# Patient Record
Sex: FEMALE | Race: OTHER | HISPANIC OR LATINO | URBAN - METROPOLITAN AREA
[De-identification: names, ages, dates, MRNs, and addresses within clinical notes are randomized per-mention and may not be internally consistent; named-entity substitution may affect disease eponyms.]

---

## 2017-04-05 ENCOUNTER — EMERGENCY (EMERGENCY)
Facility: HOSPITAL | Age: 67
LOS: 0 days | Discharge: HOME | End: 2017-04-05

## 2017-06-27 DIAGNOSIS — Z88.8 ALLERGY STATUS TO OTHER DRUGS, MEDICAMENTS AND BIOLOGICAL SUBSTANCES STATUS: ICD-10-CM

## 2017-06-27 DIAGNOSIS — R06.02 SHORTNESS OF BREATH: ICD-10-CM

## 2017-06-27 DIAGNOSIS — Z90.710 ACQUIRED ABSENCE OF BOTH CERVIX AND UTERUS: ICD-10-CM

## 2017-06-27 DIAGNOSIS — J45.901 UNSPECIFIED ASTHMA WITH (ACUTE) EXACERBATION: ICD-10-CM

## 2017-06-27 DIAGNOSIS — Z79.4 LONG TERM (CURRENT) USE OF INSULIN: ICD-10-CM

## 2017-06-27 DIAGNOSIS — E78.00 PURE HYPERCHOLESTEROLEMIA, UNSPECIFIED: ICD-10-CM

## 2017-06-27 DIAGNOSIS — E11.9 TYPE 2 DIABETES MELLITUS WITHOUT COMPLICATIONS: ICD-10-CM

## 2017-06-27 DIAGNOSIS — I10 ESSENTIAL (PRIMARY) HYPERTENSION: ICD-10-CM

## 2017-06-27 DIAGNOSIS — Z79.82 LONG TERM (CURRENT) USE OF ASPIRIN: ICD-10-CM

## 2017-06-27 DIAGNOSIS — Z88.6 ALLERGY STATUS TO ANALGESIC AGENT: ICD-10-CM

## 2017-12-18 ENCOUNTER — INPATIENT (INPATIENT)
Facility: HOSPITAL | Age: 67
LOS: 0 days | Discharge: HOME | End: 2017-12-18
Attending: EMERGENCY MEDICINE

## 2017-12-18 DIAGNOSIS — J44.1 CHRONIC OBSTRUCTIVE PULMONARY DISEASE WITH (ACUTE) EXACERBATION: ICD-10-CM

## 2017-12-18 DIAGNOSIS — J45.901 UNSPECIFIED ASTHMA WITH (ACUTE) EXACERBATION: ICD-10-CM

## 2017-12-22 ENCOUNTER — INPATIENT (INPATIENT)
Facility: HOSPITAL | Age: 67
LOS: 5 days | Discharge: HOME | End: 2017-12-28
Attending: INTERNAL MEDICINE

## 2017-12-22 DIAGNOSIS — J45.901 UNSPECIFIED ASTHMA WITH (ACUTE) EXACERBATION: ICD-10-CM

## 2017-12-22 DIAGNOSIS — J44.1 CHRONIC OBSTRUCTIVE PULMONARY DISEASE WITH (ACUTE) EXACERBATION: ICD-10-CM

## 2018-01-02 DIAGNOSIS — F41.9 ANXIETY DISORDER, UNSPECIFIED: ICD-10-CM

## 2018-01-02 DIAGNOSIS — K59.00 CONSTIPATION, UNSPECIFIED: ICD-10-CM

## 2018-01-02 DIAGNOSIS — I10 ESSENTIAL (PRIMARY) HYPERTENSION: ICD-10-CM

## 2018-01-02 DIAGNOSIS — F32.9 MAJOR DEPRESSIVE DISORDER, SINGLE EPISODE, UNSPECIFIED: ICD-10-CM

## 2018-01-02 DIAGNOSIS — E87.1 HYPO-OSMOLALITY AND HYPONATREMIA: ICD-10-CM

## 2018-01-02 DIAGNOSIS — E87.6 HYPOKALEMIA: ICD-10-CM

## 2018-01-02 DIAGNOSIS — J45.901 UNSPECIFIED ASTHMA WITH (ACUTE) EXACERBATION: ICD-10-CM

## 2018-01-02 DIAGNOSIS — E78.00 PURE HYPERCHOLESTEROLEMIA, UNSPECIFIED: ICD-10-CM

## 2018-01-02 DIAGNOSIS — E11.65 TYPE 2 DIABETES MELLITUS WITH HYPERGLYCEMIA: ICD-10-CM

## 2018-01-02 DIAGNOSIS — Z79.4 LONG TERM (CURRENT) USE OF INSULIN: ICD-10-CM

## 2018-01-02 DIAGNOSIS — J06.9 ACUTE UPPER RESPIRATORY INFECTION, UNSPECIFIED: ICD-10-CM

## 2018-01-02 DIAGNOSIS — J40 BRONCHITIS, NOT SPECIFIED AS ACUTE OR CHRONIC: ICD-10-CM

## 2018-01-02 DIAGNOSIS — J45.31 MILD PERSISTENT ASTHMA WITH (ACUTE) EXACERBATION: ICD-10-CM

## 2018-04-03 PROBLEM — Z00.00 ENCOUNTER FOR PREVENTIVE HEALTH EXAMINATION: Status: ACTIVE | Noted: 2018-04-03

## 2018-08-09 ENCOUNTER — OUTPATIENT (OUTPATIENT)
Dept: OUTPATIENT SERVICES | Facility: HOSPITAL | Age: 68
LOS: 1 days | Discharge: HOME | End: 2018-08-09

## 2018-08-09 DIAGNOSIS — E78.00 PURE HYPERCHOLESTEROLEMIA, UNSPECIFIED: ICD-10-CM

## 2018-12-26 ENCOUNTER — TRANSCRIPTION ENCOUNTER (OUTPATIENT)
Age: 68
End: 2018-12-26

## 2019-02-20 ENCOUNTER — INPATIENT (INPATIENT)
Facility: HOSPITAL | Age: 69
LOS: 3 days | Discharge: HOME | End: 2019-02-24
Attending: INTERNAL MEDICINE | Admitting: INTERNAL MEDICINE

## 2019-02-20 VITALS
RESPIRATION RATE: 18 BRPM | HEART RATE: 110 BPM | DIASTOLIC BLOOD PRESSURE: 60 MMHG | OXYGEN SATURATION: 98 % | SYSTOLIC BLOOD PRESSURE: 123 MMHG | TEMPERATURE: 103 F

## 2019-02-20 LAB
ALBUMIN SERPL ELPH-MCNC: 3.6 G/DL — SIGNIFICANT CHANGE UP (ref 3.5–5.2)
ALP SERPL-CCNC: 94 U/L — SIGNIFICANT CHANGE UP (ref 30–115)
ALT FLD-CCNC: 11 U/L — SIGNIFICANT CHANGE UP (ref 0–41)
ANION GAP SERPL CALC-SCNC: 16 MMOL/L — HIGH (ref 7–14)
ANISOCYTOSIS BLD QL: SLIGHT — SIGNIFICANT CHANGE UP
APPEARANCE UR: CLEAR — SIGNIFICANT CHANGE UP
AST SERPL-CCNC: 22 U/L — SIGNIFICANT CHANGE UP (ref 0–41)
BACTERIA # UR AUTO: ABNORMAL /HPF
BASE EXCESS BLDV CALC-SCNC: 8.8 MMOL/L — HIGH (ref -2–2)
BASOPHILS # BLD AUTO: 0 K/UL — SIGNIFICANT CHANGE UP (ref 0–0.2)
BASOPHILS NFR BLD AUTO: 0 % — SIGNIFICANT CHANGE UP (ref 0–1)
BILIRUB DIRECT SERPL-MCNC: 0.2 MG/DL — SIGNIFICANT CHANGE UP (ref 0–0.2)
BILIRUB INDIRECT FLD-MCNC: 0.5 MG/DL — SIGNIFICANT CHANGE UP (ref 0.2–1.2)
BILIRUB SERPL-MCNC: 0.7 MG/DL — SIGNIFICANT CHANGE UP (ref 0.2–1.2)
BILIRUB UR-MCNC: NEGATIVE — SIGNIFICANT CHANGE UP
BUN SERPL-MCNC: 11 MG/DL — SIGNIFICANT CHANGE UP (ref 10–20)
CA-I SERPL-SCNC: 1.08 MMOL/L — LOW (ref 1.12–1.3)
CALCIUM SERPL-MCNC: 8.4 MG/DL — LOW (ref 8.5–10.1)
CHLORIDE SERPL-SCNC: 88 MMOL/L — LOW (ref 98–110)
CO2 SERPL-SCNC: 28 MMOL/L — SIGNIFICANT CHANGE UP (ref 17–32)
COLOR SPEC: YELLOW — SIGNIFICANT CHANGE UP
CREAT SERPL-MCNC: 0.8 MG/DL — SIGNIFICANT CHANGE UP (ref 0.7–1.5)
DIFF PNL FLD: ABNORMAL
EOSINOPHIL # BLD AUTO: 0.17 K/UL — SIGNIFICANT CHANGE UP (ref 0–0.7)
EOSINOPHIL NFR BLD AUTO: 0.9 % — SIGNIFICANT CHANGE UP (ref 0–8)
EPI CELLS # UR: ABNORMAL /HPF
FLU A RESULT: NEGATIVE — SIGNIFICANT CHANGE UP
FLU A RESULT: NEGATIVE — SIGNIFICANT CHANGE UP
FLUAV AG NPH QL: NEGATIVE — SIGNIFICANT CHANGE UP
FLUBV AG NPH QL: NEGATIVE — SIGNIFICANT CHANGE UP
GAS PNL BLDV: 131 MMOL/L — LOW (ref 136–145)
GAS PNL BLDV: SIGNIFICANT CHANGE UP
GIANT PLATELETS BLD QL SMEAR: PRESENT — SIGNIFICANT CHANGE UP
GLUCOSE SERPL-MCNC: 308 MG/DL — HIGH (ref 70–99)
GLUCOSE UR QL: 250 MG/DL
HCO3 BLDV-SCNC: 33 MMOL/L — HIGH (ref 22–29)
HCT VFR BLD CALC: 28.1 % — LOW (ref 37–47)
HCT VFR BLDA CALC: 30.4 % — LOW (ref 34–44)
HGB BLD CALC-MCNC: 9.9 G/DL — LOW (ref 14–18)
HGB BLD-MCNC: 9.3 G/DL — LOW (ref 12–16)
KETONES UR-MCNC: ABNORMAL
LACTATE BLDV-MCNC: 2 MMOL/L — HIGH (ref 0.5–1.6)
LACTATE SERPL-SCNC: 2 MMOL/L — SIGNIFICANT CHANGE UP (ref 0.5–2.2)
LEUKOCYTE ESTERASE UR-ACNC: NEGATIVE — SIGNIFICANT CHANGE UP
LIDOCAIN IGE QN: 20 U/L — SIGNIFICANT CHANGE UP (ref 7–60)
LYMPHOCYTES # BLD AUTO: 0.5 K/UL — LOW (ref 1.2–3.4)
LYMPHOCYTES # BLD AUTO: 2.6 % — LOW (ref 20.5–51.1)
MAGNESIUM SERPL-MCNC: 1.6 MG/DL — LOW (ref 1.8–2.4)
MANUAL SMEAR VERIFICATION: SIGNIFICANT CHANGE UP
MCHC RBC-ENTMCNC: 28.8 PG — SIGNIFICANT CHANGE UP (ref 27–31)
MCHC RBC-ENTMCNC: 33.1 G/DL — SIGNIFICANT CHANGE UP (ref 32–37)
MCV RBC AUTO: 87 FL — SIGNIFICANT CHANGE UP (ref 81–99)
MONOCYTES # BLD AUTO: 1.17 K/UL — HIGH (ref 0.1–0.6)
MONOCYTES NFR BLD AUTO: 6.1 % — SIGNIFICANT CHANGE UP (ref 1.7–9.3)
NEUTROPHILS # BLD AUTO: 16.61 K/UL — HIGH (ref 1.4–6.5)
NEUTROPHILS NFR BLD AUTO: 86.9 % — HIGH (ref 42.2–75.2)
NITRITE UR-MCNC: NEGATIVE — SIGNIFICANT CHANGE UP
NRBC # BLD: 0 /100 WBCS — SIGNIFICANT CHANGE UP (ref 0–0)
PCO2 BLDV: 45 MMHG — SIGNIFICANT CHANGE UP (ref 41–51)
PH BLDV: 7.48 — HIGH (ref 7.26–7.43)
PH UR: 7 — SIGNIFICANT CHANGE UP (ref 5–8)
PLAT MORPH BLD: NORMAL — SIGNIFICANT CHANGE UP
PLATELET # BLD AUTO: 270 K/UL — SIGNIFICANT CHANGE UP (ref 130–400)
PO2 BLDV: 23 MMHG — SIGNIFICANT CHANGE UP (ref 20–40)
POTASSIUM BLDV-SCNC: 2.9 MMOL/L — LOW (ref 3.3–5.6)
POTASSIUM SERPL-MCNC: 4 MMOL/L — SIGNIFICANT CHANGE UP (ref 3.5–5)
POTASSIUM SERPL-SCNC: 4 MMOL/L — SIGNIFICANT CHANGE UP (ref 3.5–5)
PROT SERPL-MCNC: 6.6 G/DL — SIGNIFICANT CHANGE UP (ref 6–8)
PROT UR-MCNC: 100 MG/DL
RBC # BLD: 3.23 M/UL — LOW (ref 4.2–5.4)
RBC # FLD: 14.3 % — SIGNIFICANT CHANGE UP (ref 11.5–14.5)
RBC BLD AUTO: ABNORMAL
RBC CASTS # UR COMP ASSIST: ABNORMAL /HPF
RSV RESULT: NEGATIVE — SIGNIFICANT CHANGE UP
RSV RNA RESP QL NAA+PROBE: NEGATIVE — SIGNIFICANT CHANGE UP
SAO2 % BLDV: 43 % — SIGNIFICANT CHANGE UP
SODIUM SERPL-SCNC: 132 MMOL/L — LOW (ref 135–146)
SP GR SPEC: 1.01 — SIGNIFICANT CHANGE UP (ref 1.01–1.03)
UROBILINOGEN FLD QL: 1 MG/DL (ref 0.2–0.2)
VARIANT LYMPHS # BLD: 3.5 % — SIGNIFICANT CHANGE UP (ref 0–5)
WBC # BLD: 19.11 K/UL — HIGH (ref 4.8–10.8)
WBC # FLD AUTO: 19.11 K/UL — HIGH (ref 4.8–10.8)
WBC UR QL: SIGNIFICANT CHANGE UP /HPF

## 2019-02-20 RX ORDER — POTASSIUM CHLORIDE 20 MEQ
40 PACKET (EA) ORAL ONCE
Qty: 0 | Refills: 0 | Status: COMPLETED | OUTPATIENT
Start: 2019-02-20 | End: 2019-02-20

## 2019-02-20 RX ORDER — ACETAMINOPHEN 500 MG
975 TABLET ORAL ONCE
Qty: 0 | Refills: 0 | Status: COMPLETED | OUTPATIENT
Start: 2019-02-20 | End: 2019-02-20

## 2019-02-20 RX ORDER — SODIUM CHLORIDE 9 MG/ML
2000 INJECTION INTRAMUSCULAR; INTRAVENOUS; SUBCUTANEOUS ONCE
Qty: 0 | Refills: 0 | Status: COMPLETED | OUTPATIENT
Start: 2019-02-20 | End: 2019-02-20

## 2019-02-20 RX ORDER — CHLORHEXIDINE GLUCONATE 213 G/1000ML
1 SOLUTION TOPICAL
Qty: 0 | Refills: 0 | Status: DISCONTINUED | OUTPATIENT
Start: 2019-02-20 | End: 2019-02-24

## 2019-02-20 RX ORDER — MAGNESIUM SULFATE 500 MG/ML
2 VIAL (ML) INJECTION ONCE
Qty: 0 | Refills: 0 | Status: COMPLETED | OUTPATIENT
Start: 2019-02-20 | End: 2019-02-20

## 2019-02-20 RX ADMIN — Medication 40 MILLIEQUIVALENT(S): at 21:41

## 2019-02-20 RX ADMIN — Medication 50 GRAM(S): at 22:00

## 2019-02-20 RX ADMIN — Medication 975 MILLIGRAM(S): at 19:55

## 2019-02-20 RX ADMIN — SODIUM CHLORIDE 2000 MILLILITER(S): 9 INJECTION INTRAMUSCULAR; INTRAVENOUS; SUBCUTANEOUS at 19:53

## 2019-02-20 NOTE — ED PROVIDER NOTE - CARE PLAN
Principal Discharge DX:	Pneumonia  Secondary Diagnosis:	Hypomagnesemia  Secondary Diagnosis:	Hypokalemia

## 2019-02-20 NOTE — ED ADULT TRIAGE NOTE - CHIEF COMPLAINT QUOTE
As per daughter, pt was shaking earlier today and has been depressed with no appetite the last 5 days.

## 2019-02-20 NOTE — ED PROVIDER NOTE - CLINICAL SUMMARY MEDICAL DECISION MAKING FREE TEXT BOX
pt w/ 5 days feeling weak, productive cough. leukocytosis to 19, CXR showing PNA. will admit for CAP treatment.

## 2019-02-20 NOTE — ED ADULT NURSE NOTE - PMH
Diabetes    HTN (hypertension) Anxiety and depression    Diabetes    HTN (hypertension)    Hyperlipidemia

## 2019-02-20 NOTE — H&P ADULT - NSHPLABSRESULTS_GEN_ALL_CORE
.  LABS:                         9.3    19.11 )-----------( 270      ( 2019 20:10 )             28.1         132<L>  |  88<L>  |  11  ----------------------------<  308<H>  4.0   |  28  |  0.8    Ca    8.4<L>      2019 20:10  Mg     1.6         TPro  6.6  /  Alb  3.6  /  TBili  0.7  /  DBili  0.2  /  AST  22  /  ALT  11  /  AlkPhos  94        Urinalysis Basic - ( 2019 21:22 )    Color: Yellow / Appearance: Clear / S.010 / pH: x  Gluc: x / Ketone: Trace  / Bili: Negative / Urobili: 1.0 mg/dL   Blood: x / Protein: 100 mg/dL / Nitrite: Negative   Leuk Esterase: Negative / RBC: 3-5 /HPF / WBC 3-5 /HPF   Sq Epi: x / Non Sq Epi: Occasional /HPF / Bacteria: Few /HPF    Lactate, Blood: 2.0 mmol/L ( @ 20:10)    RADIOLOGY, EKG & ADDITIONAL TESTS: Reviewed.

## 2019-02-20 NOTE — ED PROVIDER NOTE - PHYSICAL EXAMINATION
VITAL SIGNS: I have reviewed nursing notes and confirm.  CONSTITUTIONAL: Well-developed; well-nourished; in no acute distress.  SKIN: Skin exam is warm and dry, no acute rash.  HEAD: Normocephalic; atraumatic.  EYES: Conjunctiva and sclera clear.  ENT: No nasal discharge; airway clear.   NECK: Supple; non tender.  CARD: S1, S2 normal; no murmurs, gallops, or rubs. Regular rate and rhythm.  RESP: No wheezes, or rhonchi. Speaking in full sentences. (+) right sided rales  ABD: Normal bowel sounds; soft; non-distended; non-tender; No rebound or guarding. No CVA tenderness.  EXT: Normal ROM. No clubbing, cyanosis or edema.  NEURO: Alert, oriented. Grossly unremarkable. No focal deficits. CN II-XII intact. No dysmetria. No ataxia. Sensation intact and equal throughout. Strength 5/5 throughout. Gait steady.

## 2019-02-20 NOTE — ED PROVIDER NOTE - ATTENDING CONTRIBUTION TO CARE
67 yo hx hld, dm, anxiety, depression, copd/asthma not on home o2 here for 5 days feeling weak. cough today w/ minimal white sputum. + body aches and increased frequency of urination. pt also endorsing mild headache- not sudden/severe. no n/v/d. no cp/sob.     vs febrile, tachycardic  gen- NAD, aaox3  card-rrr  lungs- no resp distress, bibasilar crackles  abd-sntnd, no guarding or rebound  neuro- full str/sensation, cn ii-xii grossly intact, normal coordination, no meningismus    r/o pna vs flu  unlikely intra-abdo source, no evidence of meningitis

## 2019-02-20 NOTE — ED ADULT NURSE NOTE - OBJECTIVE STATEMENT
pt complaining of dysuria, fever and generalized weakness x 2 days worsening today. pt has taken tylenol for fever. pt reports decreased po intake x 2 days, and dizziness and headache. pt denies abd pain denies chest pain, complaining of cough denies shortness of breath.

## 2019-02-20 NOTE — H&P ADULT - ATTENDING COMMENTS
This is 67 yo female with PMH of DM type2, HTN, asthma, and anxiety/depression came to ED c/o fever and chills with weakness. Symptoms started 5 days ago with fever, chills, weakness and body aches, for the last tow days she developed dry cough with pleuritic chest pain on the right side. She also has poor appetite and couldn't eat much no swallowing difficulties. She denies any recent travel, her grand kids were recently sick.   In the ED her VS showed Temp 102.1F, , BP stable. Labs showed WBC 19K, CXR showed right upper lobe consolidation.     PHYSICAL EXAM:  GENERAL: NAD, well-developed  HEAD:  Atraumatic, Normocephalic  EYES: EOMI, PERRLA, conjunctiva and sclera clear  NECK: Supple, No JVD  CHEST/LUNG: Right middle lung tubular breathing with coarse crackles.   HEART: Regular rate and rhythm; S1 S2  ABDOMEN: Soft, Nontender, Nondistended; Bowel sounds present  EXTREMITIES:  2+ Peripheral Pulses, No clubbing, cyanosis, or edema  PSYCH: AAOx3  NEUROLOGY: non-focal  SKIN: No rashes or lesions    A/P   Sepsis  Community acquired Pneumonia:   CXR showed right upper lobe consolidation.   Start on Levaquin 750mg daily.   Send blood Cx, check urine for legionella and strep Pneumonia. Check serum mycoplasma Ab.     HTN:   Continue Norvasc and Losartan    Asthma:   Continue home meds, DuoNeb q 4hrs prn.     Anxiety:   continue home meds    Anemia: normocytic  no acute bleeding, monitor H/H.

## 2019-02-20 NOTE — H&P ADULT - NSHPPHYSICALEXAM_GEN_ALL_CORE
.  VITAL SIGNS:  T(C): 37.8 (02-20-19 @ 21:54), Max: 39.4 (02-20-19 @ 18:26)  T(F): 100.1 (02-20-19 @ 21:54), Max: 102.9 (02-20-19 @ 18:26)  HR: 75 (02-20-19 @ 21:54) (75 - 110)  BP: 105/57 (02-20-19 @ 21:54) (105/57 - 123/60)  BP(mean): --  RR: 18 (02-20-19 @ 21:54) (18 - 18)  SpO2: 98% (02-20-19 @ 21:54) (98% - 98%)  Wt(kg): --    PHYSICAL EXAM:  GEN: No acute distress, witnessed ambulating independently on room air  PULM: R sided crackles  CARD: S1/S2 present. RRR.   GI: Soft, non-tender, non-distended. Bowel sounds present  NEURO: AAOX3

## 2019-02-20 NOTE — H&P ADULT - HISTORY OF PRESENT ILLNESS
67 yo F with PMHx of DM, HLD, asthma, and anxiety/depression presents to the ED c/o generalized weakness and body aches x 5 days with developed of fever/chills/cough productive of white sputum yesterday. Also endorses appetite loss of 5 day duration. She has been taking Tylenol with minimal relief of symptoms. She admits to associated mild headache and urinary frequency. Denies other complaints. Pt denies nausea, vomiting, abdominal pain, diarrhea, dizziness, chest pain, SOB, back pain, LOC, trauma, urinary symptoms, calf pain/swelling, recent travel, recent surgery. 67 yo F with PMHx of DM, HLD, asthma, and anxiety/depression presents to the ED c/o generalized weakness and body aches x 5 days with developed of fever/chills/cough productive of white sputum yesterday. Also endorses appetite loss of 5 day duration. She has been taking Tylenol with minimal relief of symptoms. She admits to associated mild headache and urinary frequency. Denies other complaints. Pt denies nausea, vomiting, abdominal pain, diarrhea, dizziness, chest pain, SOB, back pain, LOC, trauma, urinary symptoms, calf pain/swelling, recent travel, recent surgery. Last admission was one year ago due to asthma exacerbation. Was not on any antibiotic therapy immediately prior to this admission.

## 2019-02-20 NOTE — ED PROVIDER NOTE - OBJECTIVE STATEMENT
67 yo F with PMHx of DM, HLD, asthma, and anxiety/depression presents to the ED c/o weakness and body aches x 5 days with developed of fever/chills/cough productive of white sputum yesterday. She has been taking Tylenol with minimal relief of symptoms. She admits to associated mild headache and urinary frequency. Denies other complaints. Pt denies nausea, vomiting, abdominal pain, diarrhea, dizziness, chest pain, SOB, back pain, LOC, trauma, urinary symptoms, calf pain/swelling, recent travel, recent surgery.

## 2019-02-20 NOTE — ED ADULT NURSE NOTE - NSIMPLEMENTINTERV_GEN_ALL_ED
Implemented All Universal Safety Interventions:  [place] to call system. Call bell, personal items and telephone within reach. Instruct patient to call for assistance. Room bathroom lighting operational. Non-slip footwear when patient is off stretcher. Physically safe environment: no spills, clutter or unnecessary equipment. Stretcher in lowest position, wheels locked, appropriate side rails in place.

## 2019-02-20 NOTE — ED PROVIDER NOTE - NS ED ROS FT
Review of Systems  Constitutional:  (+) fever, chills, generalized weakness  Eyes:  No visual changes, eye pain, or discharge.  ENMT:  No hearing changes, pain, or discharge. No nasal congestion, discharge, or bleeding. No throat pain, swelling, or difficulty swallowing.  Cardiac:  No chest pain, palpitations, syncope, or edema.  Respiratory:  No dyspnea, orthopnea, stridor, wheezing. No hemoptysis. (+) cough  GI:  No nausea, vomiting, diarrhea, or abdominal pain. No melena or hematochezia.  :  No dysuria, hematuria, frequency, or burning.   MS:  No myalgia, muscle weakness, gait abnormality, joint swelling, joint pain, or back pain.  Skin:  No skin rash, pruritis, jaundice, or lesions.  Neuro:  No dizziness, loss of sensation, or focal weakness.  No change in mental status. (+) headache  Endocrine: No history of thyroid disease. (+) DM  Heme/Lymph: No jaundice, easy bruising, or bleeding tendency.

## 2019-02-20 NOTE — H&P ADULT - ASSESSMENT
69 yo F with PMHx of DM, HLD, asthma, and anxiety/depression presented to the ED c/o generalized weakness, loss of appetite, and body aches x 5 days with developed of fever/chills/cough productive of white sputum yesterday.    # Sepsis (fever, tachycardic, WBC elevated on admission) due to Community Acquired Pneumonia  - CURB65 of 1  - Flu/RSV negative  - CXR w/ RML opacity  - f/u UCx/BCx  - lactate 2.0  - s/w levofloxacin  - trend lactate    # Loss of appetite likely due to ongoing infection  - pt attributes to anxiety  - will hold off on IVFs for now, pt appears well hydrated and reports good hydration    # HTN  - c/w home meds    # Anxiety  - c/w home meds    # Diabetes  - FS, s/w lantus/lispro if >180 x 2    # Hypomagnesemia  - repleted    DVT Ppx: Lovenox  Dispo: From Home 69 yo F with PMHx of DM, HLD, asthma, and anxiety/depression presented to the ED c/o generalized weakness, loss of appetite, and body aches x 5 days with developed of fever/chills/cough productive of white sputum yesterday.    # Sepsis (fever, tachycardic, WBC elevated on admission) due to Community Acquired Pneumonia  - CURB65 of 1  - Flu/RSV negative  - CXR w/ RML opacity  - f/u UCx/BCx  - lactate 2.0  - s/w levofloxacin  - trend lactate    # Loss of appetite likely due to ongoing infection  - pt attributes to anxiety  - will hold off on IVFs for now, pt appears well hydrated and reports good hydration    # Asthma  - c/w home meds    # HTN  - c/w home meds    # Anxiety  - c/w home meds    # Diabetes  - FS, s/w lantus/lispro if >180 x 2    # Hypomagnesemia  - repleted    DVT Ppx: Lovenox  Dispo: From Home 67 yo F with PMHx of DM, HLD, asthma, and anxiety/depression presented to the ED c/o generalized weakness, loss of appetite, and body aches x 5 days with developed of fever/chills/cough productive of white sputum yesterday.    # Sepsis (fever, tachycardic, WBC elevated on admission) due to Community Acquired Pneumonia  - CURB65 of 1  - Flu/RSV negative  - CXR w/ RML opacity  - f/u UCx/BCx  - lactate 2.0  - s/w levofloxacin  - s/w IVF   - trend lactate    # Loss of appetite likely due to ongoing infection  - pt attributes to anxiety    # Asthma  - c/w home meds    # HTN  - c/w home meds    # Anxiety  - c/w home meds    # Diabetes  - FS, s/w lantus/lispro if >180 x 2    # Hypomagnesemia  - repleted    DVT Ppx: Lovenox  Dispo: From Home

## 2019-02-21 DIAGNOSIS — Z90.49 ACQUIRED ABSENCE OF OTHER SPECIFIED PARTS OF DIGESTIVE TRACT: Chronic | ICD-10-CM

## 2019-02-21 DIAGNOSIS — Z90.710 ACQUIRED ABSENCE OF BOTH CERVIX AND UTERUS: Chronic | ICD-10-CM

## 2019-02-21 LAB
ANION GAP SERPL CALC-SCNC: 16 MMOL/L — HIGH (ref 7–14)
BASOPHILS # BLD AUTO: 0.04 K/UL — SIGNIFICANT CHANGE UP (ref 0–0.2)
BASOPHILS NFR BLD AUTO: 0.2 % — SIGNIFICANT CHANGE UP (ref 0–1)
BUN SERPL-MCNC: 7 MG/DL — LOW (ref 10–20)
CALCIUM SERPL-MCNC: 8.4 MG/DL — LOW (ref 8.5–10.1)
CHLORIDE SERPL-SCNC: 96 MMOL/L — LOW (ref 98–110)
CO2 SERPL-SCNC: 28 MMOL/L — SIGNIFICANT CHANGE UP (ref 17–32)
CREAT SERPL-MCNC: 0.6 MG/DL — LOW (ref 0.7–1.5)
EOSINOPHIL # BLD AUTO: 0 K/UL — SIGNIFICANT CHANGE UP (ref 0–0.7)
EOSINOPHIL NFR BLD AUTO: 0 % — SIGNIFICANT CHANGE UP (ref 0–8)
GLUCOSE BLDC GLUCOMTR-MCNC: 118 MG/DL — HIGH (ref 70–99)
GLUCOSE BLDC GLUCOMTR-MCNC: 261 MG/DL — HIGH (ref 70–99)
GLUCOSE BLDC GLUCOMTR-MCNC: 274 MG/DL — HIGH (ref 70–99)
GLUCOSE BLDC GLUCOMTR-MCNC: 275 MG/DL — HIGH (ref 70–99)
GLUCOSE SERPL-MCNC: 287 MG/DL — HIGH (ref 70–99)
GRAM STN FLD: SIGNIFICANT CHANGE UP
HCT VFR BLD CALC: 28.6 % — LOW (ref 37–47)
HGB BLD-MCNC: 9.3 G/DL — LOW (ref 12–16)
IMM GRANULOCYTES NFR BLD AUTO: 1.3 % — HIGH (ref 0.1–0.3)
LACTATE SERPL-SCNC: 1.5 MMOL/L — SIGNIFICANT CHANGE UP (ref 0.5–2.2)
LYMPHOCYTES # BLD AUTO: 1.21 K/UL — SIGNIFICANT CHANGE UP (ref 1.2–3.4)
LYMPHOCYTES # BLD AUTO: 6.5 % — LOW (ref 20.5–51.1)
MAGNESIUM SERPL-MCNC: 2.2 MG/DL — SIGNIFICANT CHANGE UP (ref 1.8–2.4)
MCHC RBC-ENTMCNC: 28.8 PG — SIGNIFICANT CHANGE UP (ref 27–31)
MCHC RBC-ENTMCNC: 32.5 G/DL — SIGNIFICANT CHANGE UP (ref 32–37)
MCV RBC AUTO: 88.5 FL — SIGNIFICANT CHANGE UP (ref 81–99)
METHOD TYPE: SIGNIFICANT CHANGE UP
MONOCYTES # BLD AUTO: 1.47 K/UL — HIGH (ref 0.1–0.6)
MONOCYTES NFR BLD AUTO: 7.9 % — SIGNIFICANT CHANGE UP (ref 1.7–9.3)
NEUTROPHILS # BLD AUTO: 15.67 K/UL — HIGH (ref 1.4–6.5)
NEUTROPHILS NFR BLD AUTO: 84.1 % — HIGH (ref 42.2–75.2)
NRBC # BLD: 0 /100 WBCS — SIGNIFICANT CHANGE UP (ref 0–0)
PLATELET # BLD AUTO: 290 K/UL — SIGNIFICANT CHANGE UP (ref 130–400)
POTASSIUM SERPL-MCNC: 4.5 MMOL/L — SIGNIFICANT CHANGE UP (ref 3.5–5)
POTASSIUM SERPL-SCNC: 4.5 MMOL/L — SIGNIFICANT CHANGE UP (ref 3.5–5)
RBC # BLD: 3.23 M/UL — LOW (ref 4.2–5.4)
RBC # FLD: 14.6 % — HIGH (ref 11.5–14.5)
S PNEUM DNA BLD POS QL NAA+NON-PROBE: SIGNIFICANT CHANGE UP
SODIUM SERPL-SCNC: 140 MMOL/L — SIGNIFICANT CHANGE UP (ref 135–146)
SPECIMEN SOURCE: SIGNIFICANT CHANGE UP
WBC # BLD: 18.64 K/UL — HIGH (ref 4.8–10.8)
WBC # FLD AUTO: 18.64 K/UL — HIGH (ref 4.8–10.8)

## 2019-02-21 RX ORDER — DEXTROSE 50 % IN WATER 50 %
15 SYRINGE (ML) INTRAVENOUS ONCE
Qty: 0 | Refills: 0 | Status: DISCONTINUED | OUTPATIENT
Start: 2019-02-21 | End: 2019-02-24

## 2019-02-21 RX ORDER — TRIAMTERENE/HYDROCHLOROTHIAZID 75 MG-50MG
1 TABLET ORAL DAILY
Qty: 0 | Refills: 0 | Status: DISCONTINUED | OUTPATIENT
Start: 2019-02-21 | End: 2019-02-24

## 2019-02-21 RX ORDER — BUDESONIDE AND FORMOTEROL FUMARATE DIHYDRATE 160; 4.5 UG/1; UG/1
2 AEROSOL RESPIRATORY (INHALATION)
Qty: 0 | Refills: 0 | Status: DISCONTINUED | OUTPATIENT
Start: 2019-02-21 | End: 2019-02-24

## 2019-02-21 RX ORDER — PIOGLITAZONE HYDROCHLORIDE 15 MG/1
1 TABLET ORAL
Qty: 0 | Refills: 0 | COMMUNITY

## 2019-02-21 RX ORDER — INSULIN GLARGINE 100 [IU]/ML
7 INJECTION, SOLUTION SUBCUTANEOUS ONCE
Qty: 0 | Refills: 0 | Status: COMPLETED | OUTPATIENT
Start: 2019-02-21 | End: 2019-02-21

## 2019-02-21 RX ORDER — SODIUM CHLORIDE 9 MG/ML
1000 INJECTION, SOLUTION INTRAVENOUS
Qty: 0 | Refills: 0 | Status: DISCONTINUED | OUTPATIENT
Start: 2019-02-21 | End: 2019-02-24

## 2019-02-21 RX ORDER — MONTELUKAST 4 MG/1
1 TABLET, CHEWABLE ORAL
Qty: 0 | Refills: 0 | COMMUNITY

## 2019-02-21 RX ORDER — AMLODIPINE BESYLATE 2.5 MG/1
1 TABLET ORAL
Qty: 0 | Refills: 0 | COMMUNITY

## 2019-02-21 RX ORDER — ENOXAPARIN SODIUM 100 MG/ML
40 INJECTION SUBCUTANEOUS DAILY
Qty: 0 | Refills: 0 | Status: DISCONTINUED | OUTPATIENT
Start: 2019-02-21 | End: 2019-02-24

## 2019-02-21 RX ORDER — LOSARTAN POTASSIUM 100 MG/1
1 TABLET, FILM COATED ORAL
Qty: 0 | Refills: 0 | COMMUNITY

## 2019-02-21 RX ORDER — DEXTROSE 50 % IN WATER 50 %
25 SYRINGE (ML) INTRAVENOUS ONCE
Qty: 0 | Refills: 0 | Status: DISCONTINUED | OUTPATIENT
Start: 2019-02-21 | End: 2019-02-24

## 2019-02-21 RX ORDER — GLUCAGON INJECTION, SOLUTION 0.5 MG/.1ML
1 INJECTION, SOLUTION SUBCUTANEOUS ONCE
Qty: 0 | Refills: 0 | Status: DISCONTINUED | OUTPATIENT
Start: 2019-02-21 | End: 2019-02-24

## 2019-02-21 RX ORDER — TRIAMTERENE/HYDROCHLOROTHIAZID 75 MG-50MG
1 TABLET ORAL
Qty: 0 | Refills: 0 | COMMUNITY

## 2019-02-21 RX ORDER — METFORMIN HYDROCHLORIDE 850 MG/1
1 TABLET ORAL
Qty: 0 | Refills: 0 | COMMUNITY

## 2019-02-21 RX ORDER — DEXTROSE 50 % IN WATER 50 %
12.5 SYRINGE (ML) INTRAVENOUS ONCE
Qty: 0 | Refills: 0 | Status: DISCONTINUED | OUTPATIENT
Start: 2019-02-21 | End: 2019-02-24

## 2019-02-21 RX ORDER — VANCOMYCIN HCL 1 G
750 VIAL (EA) INTRAVENOUS EVERY 12 HOURS
Qty: 0 | Refills: 0 | Status: DISCONTINUED | OUTPATIENT
Start: 2019-02-21 | End: 2019-02-22

## 2019-02-21 RX ORDER — MONTELUKAST 4 MG/1
10 TABLET, CHEWABLE ORAL DAILY
Qty: 0 | Refills: 0 | Status: DISCONTINUED | OUTPATIENT
Start: 2019-02-21 | End: 2019-02-24

## 2019-02-21 RX ORDER — INSULIN NPH HUM/REG INSULIN HM 70-30/ML
15 VIAL (ML) SUBCUTANEOUS
Qty: 0 | Refills: 0 | COMMUNITY

## 2019-02-21 RX ORDER — CLONAZEPAM 1 MG
0.5 TABLET ORAL
Qty: 0 | Refills: 0 | Status: DISCONTINUED | OUTPATIENT
Start: 2019-02-21 | End: 2019-02-24

## 2019-02-21 RX ORDER — BUDESONIDE AND FORMOTEROL FUMARATE DIHYDRATE 160; 4.5 UG/1; UG/1
2 AEROSOL RESPIRATORY (INHALATION)
Qty: 0 | Refills: 0 | COMMUNITY

## 2019-02-21 RX ORDER — CEFEPIME 1 G/1
INJECTION, POWDER, FOR SOLUTION INTRAMUSCULAR; INTRAVENOUS
Qty: 0 | Refills: 0 | Status: DISCONTINUED | OUTPATIENT
Start: 2019-02-21 | End: 2019-02-22

## 2019-02-21 RX ORDER — LOSARTAN POTASSIUM 100 MG/1
25 TABLET, FILM COATED ORAL DAILY
Qty: 0 | Refills: 0 | Status: DISCONTINUED | OUTPATIENT
Start: 2019-02-21 | End: 2019-02-24

## 2019-02-21 RX ORDER — SIMVASTATIN 20 MG/1
20 TABLET, FILM COATED ORAL AT BEDTIME
Qty: 0 | Refills: 0 | Status: DISCONTINUED | OUTPATIENT
Start: 2019-02-21 | End: 2019-02-24

## 2019-02-21 RX ORDER — INSULIN LISPRO 100/ML
VIAL (ML) SUBCUTANEOUS
Qty: 0 | Refills: 0 | Status: DISCONTINUED | OUTPATIENT
Start: 2019-02-21 | End: 2019-02-24

## 2019-02-21 RX ORDER — INSULIN GLARGINE 100 [IU]/ML
15 INJECTION, SOLUTION SUBCUTANEOUS AT BEDTIME
Qty: 0 | Refills: 0 | Status: DISCONTINUED | OUTPATIENT
Start: 2019-02-21 | End: 2019-02-24

## 2019-02-21 RX ORDER — SODIUM CHLORIDE 9 MG/ML
1000 INJECTION INTRAMUSCULAR; INTRAVENOUS; SUBCUTANEOUS
Qty: 0 | Refills: 0 | Status: DISCONTINUED | OUTPATIENT
Start: 2019-02-21 | End: 2019-02-21

## 2019-02-21 RX ORDER — INSULIN LISPRO 100/ML
5 VIAL (ML) SUBCUTANEOUS
Qty: 0 | Refills: 0 | Status: DISCONTINUED | OUTPATIENT
Start: 2019-02-21 | End: 2019-02-24

## 2019-02-21 RX ORDER — SIMVASTATIN 20 MG/1
1 TABLET, FILM COATED ORAL
Qty: 0 | Refills: 0 | COMMUNITY

## 2019-02-21 RX ORDER — SODIUM CHLORIDE 9 MG/ML
1000 INJECTION INTRAMUSCULAR; INTRAVENOUS; SUBCUTANEOUS
Qty: 0 | Refills: 0 | Status: DISCONTINUED | OUTPATIENT
Start: 2019-02-21 | End: 2019-02-23

## 2019-02-21 RX ORDER — AMLODIPINE BESYLATE 2.5 MG/1
5 TABLET ORAL DAILY
Qty: 0 | Refills: 0 | Status: DISCONTINUED | OUTPATIENT
Start: 2019-02-21 | End: 2019-02-24

## 2019-02-21 RX ORDER — CLONAZEPAM 1 MG
1 TABLET ORAL
Qty: 0 | Refills: 0 | COMMUNITY

## 2019-02-21 RX ORDER — CEFEPIME 1 G/1
1000 INJECTION, POWDER, FOR SOLUTION INTRAMUSCULAR; INTRAVENOUS EVERY 12 HOURS
Qty: 0 | Refills: 0 | Status: DISCONTINUED | OUTPATIENT
Start: 2019-02-22 | End: 2019-02-22

## 2019-02-21 RX ORDER — CEFEPIME 1 G/1
1000 INJECTION, POWDER, FOR SOLUTION INTRAMUSCULAR; INTRAVENOUS ONCE
Qty: 0 | Refills: 0 | Status: COMPLETED | OUTPATIENT
Start: 2019-02-21 | End: 2019-02-21

## 2019-02-21 RX ADMIN — Medication 6: at 17:40

## 2019-02-21 RX ADMIN — CEFEPIME 100 MILLIGRAM(S): 1 INJECTION, POWDER, FOR SOLUTION INTRAMUSCULAR; INTRAVENOUS at 21:46

## 2019-02-21 RX ADMIN — Medication 0.5 MILLIGRAM(S): at 10:04

## 2019-02-21 RX ADMIN — CHLORHEXIDINE GLUCONATE 1 APPLICATION(S): 213 SOLUTION TOPICAL at 05:50

## 2019-02-21 RX ADMIN — SODIUM CHLORIDE 75 MILLILITER(S): 9 INJECTION INTRAMUSCULAR; INTRAVENOUS; SUBCUTANEOUS at 03:17

## 2019-02-21 RX ADMIN — AMLODIPINE BESYLATE 5 MILLIGRAM(S): 2.5 TABLET ORAL at 08:09

## 2019-02-21 RX ADMIN — Medication 1 TABLET(S): at 10:02

## 2019-02-21 RX ADMIN — Medication 0.5 MILLIGRAM(S): at 21:48

## 2019-02-21 RX ADMIN — INSULIN GLARGINE 7 UNIT(S): 100 INJECTION, SOLUTION SUBCUTANEOUS at 21:48

## 2019-02-21 RX ADMIN — SODIUM CHLORIDE 75 MILLILITER(S): 9 INJECTION INTRAMUSCULAR; INTRAVENOUS; SUBCUTANEOUS at 11:10

## 2019-02-21 RX ADMIN — Medication 40 MILLIGRAM(S): at 11:05

## 2019-02-21 RX ADMIN — Medication 5 UNIT(S): at 17:39

## 2019-02-21 RX ADMIN — BUDESONIDE AND FORMOTEROL FUMARATE DIHYDRATE 2 PUFF(S): 160; 4.5 AEROSOL RESPIRATORY (INHALATION) at 08:11

## 2019-02-21 RX ADMIN — LOSARTAN POTASSIUM 25 MILLIGRAM(S): 100 TABLET, FILM COATED ORAL at 08:09

## 2019-02-21 RX ADMIN — SODIUM CHLORIDE 75 MILLILITER(S): 9 INJECTION INTRAMUSCULAR; INTRAVENOUS; SUBCUTANEOUS at 19:04

## 2019-02-21 RX ADMIN — SIMVASTATIN 20 MILLIGRAM(S): 20 TABLET, FILM COATED ORAL at 21:43

## 2019-02-21 RX ADMIN — BUDESONIDE AND FORMOTEROL FUMARATE DIHYDRATE 2 PUFF(S): 160; 4.5 AEROSOL RESPIRATORY (INHALATION) at 21:45

## 2019-02-21 RX ADMIN — MONTELUKAST 10 MILLIGRAM(S): 4 TABLET, CHEWABLE ORAL at 11:05

## 2019-02-22 ENCOUNTER — TRANSCRIPTION ENCOUNTER (OUTPATIENT)
Age: 69
End: 2019-02-22

## 2019-02-22 LAB
ANION GAP SERPL CALC-SCNC: 16 MMOL/L — HIGH (ref 7–14)
BASOPHILS # BLD AUTO: 0.07 K/UL — SIGNIFICANT CHANGE UP (ref 0–0.2)
BASOPHILS NFR BLD AUTO: 0.6 % — SIGNIFICANT CHANGE UP (ref 0–1)
BUN SERPL-MCNC: 9 MG/DL — LOW (ref 10–20)
CALCIUM SERPL-MCNC: 9.1 MG/DL — SIGNIFICANT CHANGE UP (ref 8.5–10.1)
CHLORIDE SERPL-SCNC: 97 MMOL/L — LOW (ref 98–110)
CO2 SERPL-SCNC: 25 MMOL/L — SIGNIFICANT CHANGE UP (ref 17–32)
CREAT SERPL-MCNC: 0.6 MG/DL — LOW (ref 0.7–1.5)
CULTURE RESULTS: SIGNIFICANT CHANGE UP
EOSINOPHIL # BLD AUTO: 0.06 K/UL — SIGNIFICANT CHANGE UP (ref 0–0.7)
EOSINOPHIL NFR BLD AUTO: 0.5 % — SIGNIFICANT CHANGE UP (ref 0–8)
GLUCOSE BLDC GLUCOMTR-MCNC: 102 MG/DL — HIGH (ref 70–99)
GLUCOSE BLDC GLUCOMTR-MCNC: 227 MG/DL — HIGH (ref 70–99)
GLUCOSE BLDC GLUCOMTR-MCNC: 259 MG/DL — HIGH (ref 70–99)
GLUCOSE BLDC GLUCOMTR-MCNC: 315 MG/DL — HIGH (ref 70–99)
GLUCOSE SERPL-MCNC: 278 MG/DL — HIGH (ref 70–99)
HCT VFR BLD CALC: 29.7 % — LOW (ref 37–47)
HGB BLD-MCNC: 9.8 G/DL — LOW (ref 12–16)
IMM GRANULOCYTES NFR BLD AUTO: 1.7 % — HIGH (ref 0.1–0.3)
LYMPHOCYTES # BLD AUTO: 1.73 K/UL — SIGNIFICANT CHANGE UP (ref 1.2–3.4)
LYMPHOCYTES # BLD AUTO: 14.2 % — LOW (ref 20.5–51.1)
MCHC RBC-ENTMCNC: 29 PG — SIGNIFICANT CHANGE UP (ref 27–31)
MCHC RBC-ENTMCNC: 33 G/DL — SIGNIFICANT CHANGE UP (ref 32–37)
MCV RBC AUTO: 87.9 FL — SIGNIFICANT CHANGE UP (ref 81–99)
MONOCYTES # BLD AUTO: 0.77 K/UL — HIGH (ref 0.1–0.6)
MONOCYTES NFR BLD AUTO: 6.3 % — SIGNIFICANT CHANGE UP (ref 1.7–9.3)
NEUTROPHILS # BLD AUTO: 9.37 K/UL — HIGH (ref 1.4–6.5)
NEUTROPHILS NFR BLD AUTO: 76.7 % — HIGH (ref 42.2–75.2)
NRBC # BLD: 0 /100 WBCS — SIGNIFICANT CHANGE UP (ref 0–0)
PLATELET # BLD AUTO: 362 K/UL — SIGNIFICANT CHANGE UP (ref 130–400)
POTASSIUM SERPL-MCNC: 3.6 MMOL/L — SIGNIFICANT CHANGE UP (ref 3.5–5)
POTASSIUM SERPL-SCNC: 3.6 MMOL/L — SIGNIFICANT CHANGE UP (ref 3.5–5)
RBC # BLD: 3.38 M/UL — LOW (ref 4.2–5.4)
RBC # FLD: 14.8 % — HIGH (ref 11.5–14.5)
SODIUM SERPL-SCNC: 138 MMOL/L — SIGNIFICANT CHANGE UP (ref 135–146)
SPECIMEN SOURCE: SIGNIFICANT CHANGE UP
VANCOMYCIN TROUGH SERPL-MCNC: <4 UG/ML — LOW (ref 5–10)
WBC # BLD: 12.21 K/UL — HIGH (ref 4.8–10.8)
WBC # FLD AUTO: 12.21 K/UL — HIGH (ref 4.8–10.8)

## 2019-02-22 RX ORDER — LACTULOSE 10 G/15ML
15 SOLUTION ORAL DAILY
Qty: 0 | Refills: 0 | Status: DISCONTINUED | OUTPATIENT
Start: 2019-02-22 | End: 2019-02-24

## 2019-02-22 RX ORDER — INSULIN GLARGINE 100 [IU]/ML
9 INJECTION, SOLUTION SUBCUTANEOUS ONCE
Qty: 0 | Refills: 0 | Status: COMPLETED | OUTPATIENT
Start: 2019-02-22 | End: 2019-02-22

## 2019-02-22 RX ADMIN — Medication 6: at 10:03

## 2019-02-22 RX ADMIN — Medication 5 UNIT(S): at 10:04

## 2019-02-22 RX ADMIN — SIMVASTATIN 20 MILLIGRAM(S): 20 TABLET, FILM COATED ORAL at 21:23

## 2019-02-22 RX ADMIN — Medication 1 TABLET(S): at 05:19

## 2019-02-22 RX ADMIN — BUDESONIDE AND FORMOTEROL FUMARATE DIHYDRATE 2 PUFF(S): 160; 4.5 AEROSOL RESPIRATORY (INHALATION) at 09:31

## 2019-02-22 RX ADMIN — CHLORHEXIDINE GLUCONATE 1 APPLICATION(S): 213 SOLUTION TOPICAL at 05:20

## 2019-02-22 RX ADMIN — AMLODIPINE BESYLATE 5 MILLIGRAM(S): 2.5 TABLET ORAL at 05:17

## 2019-02-22 RX ADMIN — Medication 8: at 11:44

## 2019-02-22 RX ADMIN — Medication 250 MILLIGRAM(S): at 05:17

## 2019-02-22 RX ADMIN — SODIUM CHLORIDE 75 MILLILITER(S): 9 INJECTION INTRAMUSCULAR; INTRAVENOUS; SUBCUTANEOUS at 23:02

## 2019-02-22 RX ADMIN — Medication 0.5 MILLIGRAM(S): at 21:23

## 2019-02-22 RX ADMIN — CEFEPIME 100 MILLIGRAM(S): 1 INJECTION, POWDER, FOR SOLUTION INTRAMUSCULAR; INTRAVENOUS at 05:18

## 2019-02-22 RX ADMIN — LOSARTAN POTASSIUM 25 MILLIGRAM(S): 100 TABLET, FILM COATED ORAL at 05:17

## 2019-02-22 RX ADMIN — Medication 4: at 17:06

## 2019-02-22 RX ADMIN — LACTULOSE 15 GRAM(S): 10 SOLUTION ORAL at 17:04

## 2019-02-22 RX ADMIN — Medication 5 UNIT(S): at 17:06

## 2019-02-22 RX ADMIN — Medication 5 UNIT(S): at 11:45

## 2019-02-22 RX ADMIN — Medication 40 MILLIGRAM(S): at 11:44

## 2019-02-22 RX ADMIN — MONTELUKAST 10 MILLIGRAM(S): 4 TABLET, CHEWABLE ORAL at 11:43

## 2019-02-22 RX ADMIN — ENOXAPARIN SODIUM 40 MILLIGRAM(S): 100 INJECTION SUBCUTANEOUS at 11:43

## 2019-02-22 RX ADMIN — BUDESONIDE AND FORMOTEROL FUMARATE DIHYDRATE 2 PUFF(S): 160; 4.5 AEROSOL RESPIRATORY (INHALATION) at 20:48

## 2019-02-22 NOTE — PROGRESS NOTE ADULT - ATTENDING COMMENTS
67 yo female with PMH of DM type2, HTN, asthma, and anxiety/depression came to ED c/o fever and chills with weakness x 5 days.   for the last 2 days she developed dry cough with pleuritic chest pain on the right side. She also has poor appetite and couldn't eat much no swallowing difficulties. She denies any recent travel, her grand kids were recently sick.   In the ED her VS showed Temp 102.1F, , BP stable. Labs showed WBC 19K, CXR showed right upper lobe consolidation.     PHYSICAL EXAM:    CHEST/LUNG: Right middle lung tubular breathing with coarse crackles.   Rest as above    A/P   Sepsis (POA)  Community acquired Pneumonia - Cx grew Strep Pneum on 2/20. f/u repeat from 2/21 evening.   Change from broad spectrum to IV LVQ. Today still feeling fatigued. Anticipate D/c to home tomorrow on PO LVQ to complete a total of 6 days from the start.   Able to walk as usual.   Repeat CXR in 3 months to ensure resolution.      HTN:   Continue Norvasc and Losartan    Asthma:   Continue home meds    Anxiety:   continue home meds    Anemia: normocytic  no acute bleeding, monitor H/H.      anticipate for d/c tomorrow.

## 2019-02-22 NOTE — DIETITIAN INITIAL EVALUATION ADULT. - MD RECOMMEND
1. Continue w/ DASH/TLC diet modifier and consider carb consistent as pt w/ elevated BS. 2. Consider glucerna BID as pt w/ PCM/poor PO intake. 3. Consdier MVI/mineral as pt w/ longterm poor PO intake/supplements at home.

## 2019-02-22 NOTE — DISCHARGE NOTE ADULT - CARE PLAN
Principal Discharge DX:	Community acquired pneumonia  Goal:	stabilization  Assessment and plan of treatment:	Pt was diagnosed with community acquired pneumonia.   Her chest xray showed right upper lobe opacity.   RVP panel was negative.   Blood Culture initially grew gram positive cocci but it seems contaminated.   Repeat Cs have been sent. Pt was started on iv antibiotics She remained hemodynamically stable.   She is being discharged on oral Levofloxacin for next 2 days.   Advised to follow up with PMD  in 1 week.  Get lots of rest and plenty of fluid.  Maintain good personal and hand hygiene.  Avoid sick contacts.  Avoid smoking Principal Discharge DX:	Community acquired pneumonia  Goal:	stabilization  Assessment and plan of treatment:	Pt was diagnosed with community acquired pneumonia.   Her chest xray showed right upper lobe opacity.   RVP panel was negative.   Blood Cultures growing Strep pneumoniae. repeat blood cultures pending.   Repeat Cs have been sent. Pt was started on iv antibiotics She remained hemodynamically stable.   She is being discharged on oral Levofloxacin for next 2 days.   Advised to follow up with PMD  in 1 week.  Get lots of rest and plenty of fluid.  Maintain good personal and hand hygiene.  Avoid sick contacts.  Avoid smoking  Repeat CXR in 3 months to ensure resolution

## 2019-02-22 NOTE — DISCHARGE NOTE ADULT - MEDICATION SUMMARY - MEDICATIONS TO TAKE
I will START or STAY ON the medications listed below when I get home from the hospital:    losartan 25 mg oral tablet  -- 1 tab(s) by mouth once a day  -- Indication: For HTN (hypertension)    KlonoPIN 0.5 mg oral tablet  -- 1 tab(s) by mouth 2 times a day, As Needed  -- Indication: For Anxiety and depression    PARoxetine 40 mg oral tablet  -- 1 tab(s) by mouth once a day  -- Indication: For Anxiety and depression    pioglitazone 30 mg oral tablet  -- 1 tab(s) by mouth once a day  -- Indication: For Diabetes    HumuLIN 70/30 subcutaneous suspension  -- 15 milligram(s) subcutaneous once a day  -- Indication: For Diabetes    glipiZIDE 5 mg oral tablet  -- 1 tab(s) by mouth once a day  -- Indication: For Diabetes    metFORMIN 500 mg oral tablet  -- 1 tab(s) by mouth 2 times a day  -- Indication: For Diabetes    simvastatin 20 mg oral tablet  -- 1 tab(s) by mouth once a day (at bedtime)  -- Indication: For DYSLIPIDEMIA    triamterene-hydrochlorothiazide 37.5 mg-25 mg oral tablet  -- 1 tab(s) by mouth once a day  -- Indication: For Hypertension    Symbicort 160 mcg-4.5 mcg/inh inhalation aerosol  -- 2 puff(s) inhaled 2 times a day  -- Indication: For shortness of breath    amLODIPine 5 mg oral tablet  -- 1 tab(s) by mouth once a day  -- Indication: For Hypertension    Singulair 10 mg oral tablet  -- 1 tab(s) by mouth once a day  -- Indication: For Anti allergy    levoFLOXacin 750 mg oral tablet  -- 1 tab(s) by mouth once a day, stop after 2/25  -- Avoid prolonged or excessive exposure to direct and/or artificial sunlight while taking this medication.  Do not take dairy products, antacids, or iron preparations within one hour of this medication.  Finish all this medication unless otherwise directed by prescriber.  May cause drowsiness or dizziness.  Medication should be taken with plenty of water.    -- Indication: For Pneumonia I will START or STAY ON the medications listed below when I get home from the hospital:    losartan 25 mg oral tablet  -- 1 tab(s) by mouth once a day  -- Indication: For HTN (hypertension)    KlonoPIN 0.5 mg oral tablet  -- 1 tab(s) by mouth 2 times a day, As Needed  -- Indication: For Anxiety and depression    PARoxetine 40 mg oral tablet  -- 1 tab(s) by mouth once a day  -- Indication: For Anxiety and depression    pioglitazone 30 mg oral tablet  -- 1 tab(s) by mouth once a day  -- Indication: For Diabetes    HumuLIN 70/30 subcutaneous suspension  -- 15 milligram(s) subcutaneous once a day  -- Indication: For Diabetes    glipiZIDE 5 mg oral tablet  -- 1 tab(s) by mouth once a day  -- Indication: For Diabetes    metFORMIN 500 mg oral tablet  -- 1 tab(s) by mouth 2 times a day  -- Indication: For Diabetes    simvastatin 20 mg oral tablet  -- 1 tab(s) by mouth once a day (at bedtime)  -- Indication: For DYSLIPIDEMIA    triamterene-hydrochlorothiazide 37.5 mg-25 mg oral tablet  -- 1 tab(s) by mouth once a day  -- Indication: For Hypertension    Symbicort 160 mcg-4.5 mcg/inh inhalation aerosol  -- 2 puff(s) inhaled 2 times a day  -- Indication: For shortness of breath    amLODIPine 5 mg oral tablet  -- 1 tab(s) by mouth once a day  -- Indication: For Hypertension    Singulair 10 mg oral tablet  -- 1 tab(s) by mouth once a day  -- Indication: For Anti allergy

## 2019-02-22 NOTE — DISCHARGE NOTE ADULT - HOSPITAL COURSE
69 yo F with PMHx of DM, HLD, asthma, and anxiety/depression presented to the ED c/o generalized weakness and body aches x 5 days with developed of fever/chills/cough productive of white sputum. Also endorses appetite loss of 5 day duration. She has been taking Tylenol with minimal relief of symptoms. She admitted to associated mild headache and urinary frequency. Last admission was one year ago due to asthma exacerbation. Was not on any antibiotic therapy immediately prior to this admission. (20 Feb 2019 23:42)      >>>Pt was diagnosed with community acquired pneumonia. Her chest xray showed right upper lobe opacity. RVP panel was negative. Blood Culture initially grew gram positive cocci but it seems contaminated. Repeat Cs have been sent. Pt was started on iv antibiotics She remained hemodynamically stable. She is being discharged on oral Levofloxacin for next 2 days. Advised to follow up with PMD  in 1 week. 67 yo F with PMHx of DM, HLD, asthma, and anxiety/depression presented to the ED c/o generalized weakness and body aches x 5 days with developed of fever/chills/cough productive of white sputum. Also endorses appetite loss of 5 day duration. She has been taking Tylenol with minimal relief of symptoms. She admitted to associated mild headache and urinary frequency. Last admission was one year ago due to asthma exacerbation. Was not on any antibiotic therapy immediately prior to this admission. (20 Feb 2019 23:42)      >>>Pt was diagnosed with community acquired pneumonia. Her chest xray showed right upper lobe opacity. RVP panel was negative. Blood Culture initially grew gram positive cocci but it seems contaminated. Repeat Cs have been sent. Pt was started on iv antibiotics She remained hemodynamically stable. completed 5 days of IV LVQ inpatient  Advised to follow up with PMD  in 1 week.    Attending NOte:  Patient seen and examined independently. I personally had a face-to-face encounter with the patient, examined the patient myself and reviewed the plan of care with the housestaff. Agree with resident's note but my note supersedes that of the resident in the matters hereby listed.   D/c to home  f/u CXR in 3 months to ensure resolution.

## 2019-02-22 NOTE — DISCHARGE NOTE ADULT - PATIENT PORTAL LINK FT
You can access the [facility], offered by [facility], by registering with the following website: [email or web address]

## 2019-02-22 NOTE — DIETITIAN INITIAL EVALUATION ADULT. - SOURCE
Pt w/ poor appetite and PO intake x1 week (<25%) related to PNA, however, pt also w/ poor appetite and PO intake <50% x1 year r/t depression. Performed diabetes/general healthful nutrition diet education w/ pt and pt daughter. Pt diet unbalanced and inadequate and often w/ hypoglycemia PTA. Pt supplements a multivit/min PTA. NKFA. Last BM 2/17, pt is constipated, likely r/t poor PO intake. No chew/swallow difficulty. BS 20, skin intact. Reports UBW two weeks ago 118#, now 113# via bedscale. ~1 year ago #. Pt agreeable to try glucerna. BMI: 22. IBW: 45.5kg+/-10%./patient/family/significant other

## 2019-02-22 NOTE — DIETITIAN INITIAL EVALUATION ADULT. - OTHER INFO
Reason for assessment: c/s for decreased PO intake. Pt presented to ED w/ chief complaint of PNA. Pt w/ loss of appetite likely due to ongoing infection, pt attributes to anxiety.

## 2019-02-22 NOTE — CHART NOTE - NSCHARTNOTEFT_GEN_A_CORE
Upon Nutritional Assessment by the Registered Dietitian your patient was determined to meet criteria / has evidence of the following diagnosis/diagnoses:          [ ]  Mild Protein Calorie Malnutrition        [ ]  Moderate Protein Calorie Malnutrition        [X] Severe Protein Calorie Malnutrition        [ ] Unspecified Protein Calorie Malnutrition        [] Underweight / BMI <19        [ ] Morbid Obesity / BMI > 40    · Nutrient: severe PCM in context of social/environmental circumstances  · Etiology: poor appetite r/t anxiety/depression  · Signs/Symptoms: PO intake <50% of estimated needs x1 year, 25% wt loss x1 year    Findings as based on:  •  Comprehensive nutrition assessment and consultation  See Dietitian Initial Assessment for further information.    Treatment:    The following diet has been recommended:  1. Continue w/ DASH/TLC diet modifier and consider carb consistent as pt w/ elevated BS.   2. Consider glucerna BID as pt w/ PCM/poor PO intake.   3. Consdier MVI/minerals as pt w/ long term poor PO intake/supplements at home.    PROVIDER Section:     By signing this assessment you are acknowledging and agree with the diagnosis/diagnoses assigned by the Registered Dietitian    Comments:

## 2019-02-22 NOTE — DIETITIAN INITIAL EVALUATION ADULT. - ENERGY NEEDS
estimated energy needs: 1160-1455kcal (MSJx1.2-1.5AF) pt w/ PCM  protein needs: 62-77g (1.2-1.5g/kg) as above  fluid needs: 1ml/kcal or per LIP

## 2019-02-22 NOTE — DISCHARGE NOTE ADULT - PLAN OF CARE
stabilization Pt was diagnosed with community acquired pneumonia.   Her chest xray showed right upper lobe opacity.   RVP panel was negative.   Blood Culture initially grew gram positive cocci but it seems contaminated.   Repeat Cs have been sent. Pt was started on iv antibiotics She remained hemodynamically stable.   She is being discharged on oral Levofloxacin for next 2 days.   Advised to follow up with PMD  in 1 week.  Get lots of rest and plenty of fluid.  Maintain good personal and hand hygiene.  Avoid sick contacts.  Avoid smoking Pt was diagnosed with community acquired pneumonia.   Her chest xray showed right upper lobe opacity.   RVP panel was negative.   Blood Cultures growing Strep pneumoniae. repeat blood cultures pending.   Repeat Cs have been sent. Pt was started on iv antibiotics She remained hemodynamically stable.   She is being discharged on oral Levofloxacin for next 2 days.   Advised to follow up with PMD  in 1 week.  Get lots of rest and plenty of fluid.  Maintain good personal and hand hygiene.  Avoid sick contacts.  Avoid smoking  Repeat CXR in 3 months to ensure resolution

## 2019-02-23 LAB
-  CEFTRIAXONE: SIGNIFICANT CHANGE UP
-  ERYTHROMYCIN: SIGNIFICANT CHANGE UP
-  LEVOFLOXACIN: SIGNIFICANT CHANGE UP
-  PENICILLIN: SIGNIFICANT CHANGE UP
-  TRIMETHOPRIM/SULFAMETHOXAZOLE: SIGNIFICANT CHANGE UP
-  VANCOMYCIN: SIGNIFICANT CHANGE UP
ANION GAP SERPL CALC-SCNC: 16 MMOL/L — HIGH (ref 7–14)
BASOPHILS # BLD AUTO: 0.08 K/UL — SIGNIFICANT CHANGE UP (ref 0–0.2)
BASOPHILS NFR BLD AUTO: 1 % — SIGNIFICANT CHANGE UP (ref 0–1)
BUN SERPL-MCNC: 9 MG/DL — LOW (ref 10–20)
CALCIUM SERPL-MCNC: 8.8 MG/DL — SIGNIFICANT CHANGE UP (ref 8.5–10.1)
CHLORIDE SERPL-SCNC: 101 MMOL/L — SIGNIFICANT CHANGE UP (ref 98–110)
CO2 SERPL-SCNC: 26 MMOL/L — SIGNIFICANT CHANGE UP (ref 17–32)
CREAT SERPL-MCNC: 0.5 MG/DL — LOW (ref 0.7–1.5)
CULTURE RESULTS: SIGNIFICANT CHANGE UP
EOSINOPHIL # BLD AUTO: 0.12 K/UL — SIGNIFICANT CHANGE UP (ref 0–0.7)
EOSINOPHIL NFR BLD AUTO: 1.5 % — SIGNIFICANT CHANGE UP (ref 0–8)
GLUCOSE BLDC GLUCOMTR-MCNC: 117 MG/DL — HIGH (ref 70–99)
GLUCOSE BLDC GLUCOMTR-MCNC: 181 MG/DL — HIGH (ref 70–99)
GLUCOSE BLDC GLUCOMTR-MCNC: 228 MG/DL — HIGH (ref 70–99)
GLUCOSE BLDC GLUCOMTR-MCNC: 299 MG/DL — HIGH (ref 70–99)
GLUCOSE SERPL-MCNC: 290 MG/DL — HIGH (ref 70–99)
HCT VFR BLD CALC: 27.9 % — LOW (ref 37–47)
HGB BLD-MCNC: 8.9 G/DL — LOW (ref 12–16)
IMM GRANULOCYTES NFR BLD AUTO: 3.5 % — HIGH (ref 0.1–0.3)
LEGIONELLA AG UR QL: NEGATIVE — SIGNIFICANT CHANGE UP
LYMPHOCYTES # BLD AUTO: 1.86 K/UL — SIGNIFICANT CHANGE UP (ref 1.2–3.4)
LYMPHOCYTES # BLD AUTO: 23.3 % — SIGNIFICANT CHANGE UP (ref 20.5–51.1)
MCHC RBC-ENTMCNC: 28.7 PG — SIGNIFICANT CHANGE UP (ref 27–31)
MCHC RBC-ENTMCNC: 31.9 G/DL — LOW (ref 32–37)
MCV RBC AUTO: 90 FL — SIGNIFICANT CHANGE UP (ref 81–99)
METHOD TYPE: SIGNIFICANT CHANGE UP
METHOD TYPE: SIGNIFICANT CHANGE UP
MONOCYTES # BLD AUTO: 0.62 K/UL — HIGH (ref 0.1–0.6)
MONOCYTES NFR BLD AUTO: 7.8 % — SIGNIFICANT CHANGE UP (ref 1.7–9.3)
NEUTROPHILS # BLD AUTO: 5.01 K/UL — SIGNIFICANT CHANGE UP (ref 1.4–6.5)
NEUTROPHILS NFR BLD AUTO: 62.9 % — SIGNIFICANT CHANGE UP (ref 42.2–75.2)
NRBC # BLD: 0 /100 WBCS — SIGNIFICANT CHANGE UP (ref 0–0)
ORGANISM # SPEC MICROSCOPIC CNT: SIGNIFICANT CHANGE UP
PLATELET # BLD AUTO: 350 K/UL — SIGNIFICANT CHANGE UP (ref 130–400)
POTASSIUM SERPL-MCNC: 4 MMOL/L — SIGNIFICANT CHANGE UP (ref 3.5–5)
POTASSIUM SERPL-SCNC: 4 MMOL/L — SIGNIFICANT CHANGE UP (ref 3.5–5)
RBC # BLD: 3.1 M/UL — LOW (ref 4.2–5.4)
RBC # FLD: 15 % — HIGH (ref 11.5–14.5)
SODIUM SERPL-SCNC: 143 MMOL/L — SIGNIFICANT CHANGE UP (ref 135–146)
SPECIMEN SOURCE: SIGNIFICANT CHANGE UP
WBC # BLD: 7.97 K/UL — SIGNIFICANT CHANGE UP (ref 4.8–10.8)
WBC # FLD AUTO: 7.97 K/UL — SIGNIFICANT CHANGE UP (ref 4.8–10.8)

## 2019-02-23 RX ADMIN — BUDESONIDE AND FORMOTEROL FUMARATE DIHYDRATE 2 PUFF(S): 160; 4.5 AEROSOL RESPIRATORY (INHALATION) at 08:02

## 2019-02-23 RX ADMIN — Medication 1 TABLET(S): at 05:19

## 2019-02-23 RX ADMIN — Medication 5 UNIT(S): at 16:59

## 2019-02-23 RX ADMIN — MONTELUKAST 10 MILLIGRAM(S): 4 TABLET, CHEWABLE ORAL at 12:13

## 2019-02-23 RX ADMIN — SIMVASTATIN 20 MILLIGRAM(S): 20 TABLET, FILM COATED ORAL at 21:22

## 2019-02-23 RX ADMIN — SODIUM CHLORIDE 75 MILLILITER(S): 9 INJECTION INTRAMUSCULAR; INTRAVENOUS; SUBCUTANEOUS at 14:38

## 2019-02-23 RX ADMIN — Medication 40 MILLIGRAM(S): at 12:14

## 2019-02-23 RX ADMIN — AMLODIPINE BESYLATE 5 MILLIGRAM(S): 2.5 TABLET ORAL at 05:18

## 2019-02-23 RX ADMIN — Medication 5 UNIT(S): at 12:10

## 2019-02-23 RX ADMIN — Medication 4: at 16:59

## 2019-02-23 RX ADMIN — Medication 5 UNIT(S): at 08:24

## 2019-02-23 RX ADMIN — LACTULOSE 15 GRAM(S): 10 SOLUTION ORAL at 12:13

## 2019-02-23 RX ADMIN — Medication 0.5 MILLIGRAM(S): at 21:25

## 2019-02-23 RX ADMIN — INSULIN GLARGINE 15 UNIT(S): 100 INJECTION, SOLUTION SUBCUTANEOUS at 21:23

## 2019-02-23 RX ADMIN — LOSARTAN POTASSIUM 25 MILLIGRAM(S): 100 TABLET, FILM COATED ORAL at 05:18

## 2019-02-23 RX ADMIN — ENOXAPARIN SODIUM 40 MILLIGRAM(S): 100 INJECTION SUBCUTANEOUS at 12:12

## 2019-02-23 RX ADMIN — CHLORHEXIDINE GLUCONATE 1 APPLICATION(S): 213 SOLUTION TOPICAL at 05:18

## 2019-02-23 RX ADMIN — Medication 6: at 08:25

## 2019-02-23 NOTE — PROGRESS NOTE ADULT - ASSESSMENT
67 yo F with PMHx of DM, HLD, asthma, and anxiety/depression presented to the ED c/o generalized weakness, loss of appetite, and body aches x 5 days with developed of fever/chills/cough productive of white sputum yesterday.    # Sepsis (fever, tachycardic, WBC elevated on admission) due to Community Acquired Pneumonia  - CURB65 of 1  - Flu/RSV negative  - CXR w/ RUL opacity  - f/u UCx/BCx  - lactate 2.0  - s/w levofloxacin  - s/w IVF   - trend lactate  - F/U Legionella, and strep urine    # Loss of appetite likely due to ongoing infection  - pt attributes to anxiety    # Asthma  - c/w home meds    # HTN  - c/w home meds    # Anxiety  - c/w home meds    # Diabetes  - FS, s/w lantus/lispro if >180 x 2    # Hypomagnesemia  - repleted    DVT Ppx: Lovenox  Dispo: From Home
69 yo F with PMHx of DM, HLD, asthma, and anxiety/depression presented to the ED c/o generalized weakness, loss of appetite, and body aches x 5 days with developed of fever/chills/cough productive of white sputum yesterday.    # Streptococcal pneumonia: CAP  - CURB65 of 1  - Flu/RSV negative  - CXR w/ RUL opacity  - Blood Cs prelim 2/20 showed gram pos cocci in pairs and chains, Pt does not look toxic, sent another Blood Cs  - cw Antibiotics, would switch to oral and anticipate for tomorrow  - lactate 2.0  - s/w IVF   - trend lactate  - F/U Legionella, and strep urine  - Blood Cs 2/20 anaerobic bottle gram pos cocci in pairs and chains    # Loss of appetite likely due to ongoing infection  - pt attributes to anxiety    # Asthma  - c/w home meds    # HTN  - c/w home meds    # Anxiety  - c/w home meds    # Diabetes  - FS, s/w lantus/lispro if >180 x 2          DVT Ppx: Lovenox  GI ppx: PPI  Activity: as tolerated  Dispo: From Home
69 yo female with PMH of DM type2, HTN, asthma, and anxiety/depression came to ED c/o fever and chills with weakness x 5 days.   for the last 2 days she developed dry cough with pleuritic chest pain on the right side. She also has poor appetite and couldn't eat much no swallowing difficulties. She denies any recent travel, her grand kids were recently sick.   In the ED her VS showed Temp 102.1F, , BP stable. Labs showed WBC 19K, CXR showed right upper lobe consolidation.     PHYSICAL EXAM:    CHEST/LUNG: Right middle lung tubular breathing with coarse crackles.   Rest as above    A/P   Sepsis (POA)  Community acquired Pneumonia - Cx grew Strep Pneum on 2/20. f/u repeat from 2/21 evening.   c/w PO LVQ. Much better now. anticipate D/c to home tomorrow on PO LVQ to complete a total of 6 days from the start.   Able to walk as usual.   Repeat CXR in 3 months to ensure resolution.      HTN:   Continue Norvasc and Losartan    Asthma:   Continue home meds    Anxiety:   continue home meds    Anemia: normocytic  no acute bleeding, monitor H/H.      anticipate for d/c tomorrow.
69 yo F with PMHx of DM, HLD, asthma, and anxiety/depression presented to the ED c/o generalized weakness, loss of appetite, and body aches x 5 days with developed of fever/chills/cough productive of white sputum yesterday.    # Sepsis on admission due to Gram positive pneumonia - improving  - Bcx x1 positive for Strep pneumo  - CXR w/ RUL opacity  - Flu/RSV negative; Legionella negative  - c/w Levaquin    # Normocytic anemia   - No signs of acute bleed  - Monitor    # Diabetes Mellitus  - started with Insulin  - Monitor FSG    # Asthma- c/w home meds    # HTN - c/w home meds    # Anxiety - c/w home meds      DVT Ppx - Lovenox    Dispo: From Home. Anticipate d/c in AM

## 2019-02-23 NOTE — PROGRESS NOTE ADULT - SUBJECTIVE AND OBJECTIVE BOX
Patient is a 68y old Female who presents with a chief complaint of pneumonia (2019 15:09)  Currently admitted to medicine with the primary diagnosis of Pneumonia/Pos blood cs > most likely contaminated???     Today is hospital day 2d. This morning she is resting comfortably in bed and reports no new issues or overnight events.       PAST MEDICAL & SURGICAL HISTORY  Anxiety and depression  Hyperlipidemia  Diabetes  HTN (hypertension)  History of appendectomy  H/O: hysterectomy    SOCIAL HISTORY:  Negative for smoking/alcohol/drug use.     ALLERGIES:  NSAIDs (Unknown)    MEDICATIONS:  STANDING MEDICATIONS  amLODIPine   Tablet 5 milliGRAM(s) Oral daily  buDESOnide 160 MICROgram(s)/formoterol 4.5 MICROgram(s) Inhaler 2 Puff(s) Inhalation two times a day  cefepime   IVPB 1000 milliGRAM(s) IV Intermittent every 12 hours  cefepime   IVPB      chlorhexidine 4% Liquid 1 Application(s) Topical <User Schedule>  dextrose 5%. 1000 milliLiter(s) IV Continuous <Continuous>  dextrose 50% Injectable 12.5 Gram(s) IV Push once  dextrose 50% Injectable 25 Gram(s) IV Push once  dextrose 50% Injectable 25 Gram(s) IV Push once  enoxaparin Injectable 40 milliGRAM(s) SubCutaneous daily  insulin glargine Injectable (LANTUS) 15 Unit(s) SubCutaneous at bedtime  insulin lispro (HumaLOG) corrective regimen sliding scale   SubCutaneous three times a day before meals  insulin lispro Injectable (HumaLOG) 5 Unit(s) SubCutaneous three times a day before meals  losartan 25 milliGRAM(s) Oral daily  montelukast 10 milliGRAM(s) Oral daily  PARoxetine 40 milliGRAM(s) Oral daily  simvastatin 20 milliGRAM(s) Oral at bedtime  sodium chloride 0.9%. 1000 milliLiter(s) IV Continuous <Continuous>  triamterene 37.5 mG/hydrochlorothiazide 25 mG Tablet 1 Tablet(s) Oral daily  vancomycin  IVPB 750 milliGRAM(s) IV Intermittent every 12 hours    PRN MEDICATIONS  clonazePAM Tablet 0.5 milliGRAM(s) Oral two times a day PRN  dextrose 40% Gel 15 Gram(s) Oral once PRN  glucagon  Injectable 1 milliGRAM(s) IntraMuscular once PRN    Home Medications:  amLODIPine 5 mg oral tablet: 1 tab(s) orally once a day (2019 01:04)  glipiZIDE 5 mg oral tablet: 1 tab(s) orally once a day (:04)  HumuLIN 70/30 subcutaneous suspension: 15 milligram(s) subcutaneous once a day (:04)  KlonoPIN 0.5 mg oral tablet: 1 tab(s) orally 2 times a day, As Needed (:04)  losartan 25 mg oral tablet: 1 tab(s) orally once a day (:)  metFORMIN 500 mg oral tablet: 1 tab(s) orally 2 times a day (:)  PARoxetine 40 mg oral tablet: 1 tab(s) orally once a day (:)  pioglitazone 30 mg oral tablet: 1 tab(s) orally once a day (:)  simvastatin 20 mg oral tablet: 1 tab(s) orally once a day (at bedtime) (:)  Singulair 10 mg oral tablet: 1 tab(s) orally once a day (:)  Symbicort 160 mcg-4.5 mcg/inh inhalation aerosol: 2 puff(s) inhaled 2 times a day (:)  triamterene-hydrochlorothiazide 37.5 mg-25 mg oral tablet: 1 tab(s) orally once a day (:04)    Vital Signs Last 24 Hrs  T(C): 36.6 (2019 05:17), Max: 37.4 (2019 13:55)  T(F): 97.8 (2019 05:17), Max: 99.4 (2019 13:55)  HR: 76 (2019 05:17) (76 - 82)  BP: 113/59 (2019 05:17) (111/59 - 119/56)  BP(mean): --  RR: 18 (2019 05:17) (18 - 18)  SpO2: 97% (2019 13:55) (97% - 97%)      LABS:                        9.8    12.21 )-----------( 362      ( 2019 09:17 )             29.7     02-22    138  |  97<L>  |  9<L>  ----------------------------<  278<H>  3.6   |  25  |  0.6<L>    Ca    9.1      2019 09:17  Mg     2.2         TPro  6.6  /  Alb  3.6  /  TBili  0.7  /  DBili  0.2  /  AST  22  /  ALT  11  /  AlkPhos  94        Urinalysis Basic - ( 2019 21:22 )    Color: Yellow / Appearance: Clear / S.010 / pH: x  Gluc: x / Ketone: Trace  / Bili: Negative / Urobili: 1.0 mg/dL   Blood: x / Protein: 100 mg/dL / Nitrite: Negative   Leuk Esterase: Negative / RBC: 3-5 /HPF / WBC 3-5 /HPF   Sq Epi: x / Non Sq Epi: Occasional /HPF / Bacteria: Few /HPF            Culture - Urine (collected 2019 19:23)  Source: .Urine Clean Catch (Midstream)  Final Report (2019 07:46):    <10,000 CFU/ml Normal Urogenital danielle present    Culture - Blood (collected 2019 19:23)  Source: .Blood Blood  Preliminary Report (2019 04:01):    No growth to date.    Culture - Blood (collected 2019 19:23)  Source: .Blood Blood  Gram Stain (2019 17:49):    Growth in anaerobic bottle: Gram Positive Cocci in Pairs and Chains  Preliminary Report (2019 17:49):    Growth in anaerobic bottle: Gram Positive Cocci in Pairs and Chains    "Due to technical problems, Proteus sp. will Not be reported as part of    the BCID panel until further notice"    ***Blood Panel PCR results on this specimen are available    approximately 3 hours after the Gram stain result.***    Gram stain, PCR, and/or culture results may not always    correspond due to difference in methodologies.    ************************************************************    This PCR assay was performed using [employer].    The following targets are tested for: Enterococcus,    vancomycin resistant enterococci, Listeria monocytogenes,    coagulase negative staphylococci, S. aureus,    methicillin resistant S. aureus, Streptococcus agalactiae    (Group B), S. pneumoniae, S. pyogenes (Group A),    Acinetobacter baumannii, Enterobacter cloacae, E. coli,    Klebsiella oxytoca, K. pneumoniae, Proteus sp.,    Serratia marcescens, Haemophilus influenzae,    Neisseria meningitidis, Pseudomonas aeruginosa, Candida    albicans, C. glabrata, C krusei, C parapsilosis,    C. tropicalis and the KPC resistance gene.  Organism: Blood Culture PCR (2019 19:09)  Organism: Blood Culture PCR (2019 19:09)        RADIOLOGY:  < from: Xray Chest 1 View- PORTABLE-Urgent (19 @ 20:10) >  Impression:    New right upper lobe consolidated airspace opacity, suspicious for   pneumonia in the appropriate clinical setting; follow-up to complete   resolution is suggested.        PHYSICAL EXAM:  GEN: No acute distress  LUNGS: rigth sided crackles mild  HEART: S1/S2 present. RRR.   ABD: Soft, non-tender, non-distended. Bowel sounds present  NEURO: AAOX3
S  Much better  BAck to baseline  No sob      All other pertinent ROS negative.      02-22-19 @ 07:01  -  02-23-19 @ 07:00  --------------------------------------------------------  IN: 2050 mL / OUT: 0 mL / NET: 2050 mL      Vital Signs Last 24 Hrs  T(C): 37.1 (23 Feb 2019 13:40), Max: 37.1 (23 Feb 2019 13:40)  T(F): 98.7 (23 Feb 2019 13:40), Max: 98.7 (23 Feb 2019 13:40)  HR: 72 (23 Feb 2019 05:34) (72 - 75)  BP: 107/57 (23 Feb 2019 13:40) (104/56 - 118/59)  BP(mean): --  RR: 18 (23 Feb 2019 13:40) (18 - 18)  SpO2: --  PHYSICAL EXAM:    no change from  prior    MEDICATIONS:  MEDICATIONS  (STANDING):  amLODIPine   Tablet 5 milliGRAM(s) Oral daily  buDESOnide 160 MICROgram(s)/formoterol 4.5 MICROgram(s) Inhaler 2 Puff(s) Inhalation two times a day  chlorhexidine 4% Liquid 1 Application(s) Topical <User Schedule>  dextrose 5%. 1000 milliLiter(s) (50 mL/Hr) IV Continuous <Continuous>  dextrose 50% Injectable 12.5 Gram(s) IV Push once  dextrose 50% Injectable 25 Gram(s) IV Push once  dextrose 50% Injectable 25 Gram(s) IV Push once  enoxaparin Injectable 40 milliGRAM(s) SubCutaneous daily  insulin glargine Injectable (LANTUS) 15 Unit(s) SubCutaneous at bedtime  insulin lispro (HumaLOG) corrective regimen sliding scale   SubCutaneous three times a day before meals  insulin lispro Injectable (HumaLOG) 5 Unit(s) SubCutaneous three times a day before meals  lactulose Syrup 15 Gram(s) Oral daily  levoFLOXacin IVPB      levoFLOXacin IVPB 750 milliGRAM(s) IV Intermittent every 24 hours  losartan 25 milliGRAM(s) Oral daily  montelukast 10 milliGRAM(s) Oral daily  PARoxetine 40 milliGRAM(s) Oral daily  simvastatin 20 milliGRAM(s) Oral at bedtime  sodium chloride 0.9%. 1000 milliLiter(s) (75 mL/Hr) IV Continuous <Continuous>  triamterene 37.5 mG/hydrochlorothiazide 25 mG Tablet 1 Tablet(s) Oral daily      LABS: All Labs Reviewed:                        8.9    7.97  )-----------( 350      ( 23 Feb 2019 07:40 )             27.9     02-23    143  |  101  |  9<L>  ----------------------------<  290<H>  4.0   |  26  |  0.5<L>    Ca    8.8      23 Feb 2019 07:40            Blood Culture: 02-22 @ 09:17  Organism --  Gram Stain Blood -- Gram Stain --  Specimen Source .Blood None  Culture-Blood --    02-22 @ 01:59  Organism --  Gram Stain Blood -- Gram Stain --  Specimen Source .Blood None  Culture-Blood --    02-20 @ 19:23  Organism Blood Culture PCR  Gram Stain Blood -- Gram Stain   Growth in anaerobic bottle: Gram Positive Cocci in Pairs and Chains  Specimen Source .Blood Blood  Culture-Blood --        Radiology: reviewed
SUBJECTIVE:    Patient is a 68y old Female who presents with a chief complaint of pneumonia (2019 23:42)    Currently admitted to medicine with the primary diagnosis of Pneumonia     Today is hospital day 1d. This morning she is resting comfortably in bed and reports no new issues or overnight events.     PAST MEDICAL & SURGICAL HISTORY  Anxiety and depression  Hyperlipidemia  Diabetes  HTN (hypertension)  History of appendectomy  H/O: hysterectomy    SOCIAL HISTORY:  Negative for smoking/alcohol/drug use.     Home Medications:  Home Medications:  amLODIPine 5 mg oral tablet: 1 tab(s) orally once a day (:)  glipiZIDE 5 mg oral tablet: 1 tab(s) orally once a day (:)  HumuLIN 70/30 subcutaneous suspension: 15 milligram(s) subcutaneous once a day ()  KlonoPIN 0.5 mg oral tablet: 1 tab(s) orally 2 times a day, As Needed (:)  losartan 25 mg oral tablet: 1 tab(s) orally once a day (:)  metFORMIN 500 mg oral tablet: 1 tab(s) orally 2 times a day (:)  PARoxetine 40 mg oral tablet: 1 tab(s) orally once a day (:)  pioglitazone 30 mg oral tablet: 1 tab(s) orally once a day (:)  simvastatin 20 mg oral tablet: 1 tab(s) orally once a day (at bedtime) (:)  Singulair 10 mg oral tablet: 1 tab(s) orally once a day (:)  Symbicort 160 mcg-4.5 mcg/inh inhalation aerosol: 2 puff(s) inhaled 2 times a day (:)  triamterene-hydrochlorothiazide 37.5 mg-25 mg oral tablet: 1 tab(s) orally once a day (:)      ALLERGIES:  NSAIDs (Unknown)    MEDICATIONS:  STANDING MEDICATIONS  amLODIPine   Tablet 5 milliGRAM(s) Oral daily  buDESOnide 160 MICROgram(s)/formoterol 4.5 MICROgram(s) Inhaler 2 Puff(s) Inhalation two times a day  chlorhexidine 4% Liquid 1 Application(s) Topical <User Schedule>  dextrose 5%. 1000 milliLiter(s) IV Continuous <Continuous>  dextrose 50% Injectable 12.5 Gram(s) IV Push once  dextrose 50% Injectable 25 Gram(s) IV Push once  dextrose 50% Injectable 25 Gram(s) IV Push once  enoxaparin Injectable 40 milliGRAM(s) SubCutaneous daily  insulin glargine Injectable (LANTUS) 15 Unit(s) SubCutaneous at bedtime  insulin lispro (HumaLOG) corrective regimen sliding scale   SubCutaneous three times a day before meals  insulin lispro Injectable (HumaLOG) 5 Unit(s) SubCutaneous three times a day before meals  levoFLOXacin IVPB      losartan 25 milliGRAM(s) Oral daily  montelukast 10 milliGRAM(s) Oral daily  PARoxetine 40 milliGRAM(s) Oral daily  simvastatin 20 milliGRAM(s) Oral at bedtime  sodium chloride 0.9%. 1000 milliLiter(s) IV Continuous <Continuous>  triamterene 37.5 mG/hydrochlorothiazide 25 mG Tablet 1 Tablet(s) Oral daily    PRN MEDICATIONS  clonazePAM Tablet 0.5 milliGRAM(s) Oral two times a day PRN  dextrose 40% Gel 15 Gram(s) Oral once PRN  glucagon  Injectable 1 milliGRAM(s) IntraMuscular once PRN    VITALS:   Vital Signs Last 24 Hrs  T(C): 37.4 (2019 13:55), Max: 39.4 (2019 18:26)  T(F): 99.4 (2019 13:55), Max: 102.9 (2019 18:26)  HR: 82 (2019 13:55) (69 - 110)  BP: 119/56 (2019 13:55) (99/52 - 123/60)  BP(mean): --  RR: 18 (2019 13:55) (18 - 18)  SpO2: 98% (2019 07:54) (96% - 98%)  CAPILLARY BLOOD GLUCOSE      POCT Blood Glucose.: 261 mg/dL (2019 12:08)  POCT Blood Glucose.: 275 mg/dL (2019 08:31)      LABS:                        9.3    18.64 )-----------( 290      ( 2019 07:57 )             28.6     02-21    140  |  96<L>  |  7<L>  ----------------------------<  287<H>  4.5   |  28  |  0.6<L>    Ca    8.4<L>      2019 07:57  Mg     2.2         TPro  6.6  /  Alb  3.6  /  TBili  0.7  /  DBili  0.2  /  AST  22  /  ALT  11  /  AlkPhos  94        Urinalysis Basic - ( 2019 21:22 )    Color: Yellow / Appearance: Clear / S.010 / pH: x  Gluc: x / Ketone: Trace  / Bili: Negative / Urobili: 1.0 mg/dL   Blood: x / Protein: 100 mg/dL / Nitrite: Negative   Leuk Esterase: Negative / RBC: 3-5 /HPF / WBC 3-5 /HPF   Sq Epi: x / Non Sq Epi: Occasional /HPF / Bacteria: Few /HPF        Lactate, Blood: 1.5 mmol/L (19 @ 07:57)  Lactate, Blood: 2.0 mmol/L (19 @ 20:10)    RADIOLOGY:    PHYSICAL EXAM:  GEN: No acute distress  LUNGS: RML rales appreciated  HEART: S1/S2 present. RRR.   ABD: Soft, non-tender, non-distended. Bowel sounds present  EXT: NC/NC/NE/2+PP/SCHROEDER  NEURO: AAOX3
SUBJECTIVE:    Patient is a 68y old Female who presents with a chief complaint of pneumonia (23 Feb 2019 23:56)    Currently admitted to medicine with the primary diagnosis of Community acquired pneumonia     Today is hospital day 3d. This morning she is resting comfortably in bed and reports no new issues or overnight events.     PAST MEDICAL & SURGICAL HISTORY  Anxiety and depression  Hyperlipidemia  Diabetes  HTN (hypertension)  History of appendectomy  H/O: hysterectomy    ALLERGIES:  NSAIDs (Unknown)    MEDICATIONS:  STANDING MEDICATIONS  amLODIPine   Tablet 5 milliGRAM(s) Oral daily  buDESOnide 160 MICROgram(s)/formoterol 4.5 MICROgram(s) Inhaler 2 Puff(s) Inhalation two times a day  chlorhexidine 4% Liquid 1 Application(s) Topical <User Schedule>  dextrose 5%. 1000 milliLiter(s) IV Continuous <Continuous>  dextrose 50% Injectable 12.5 Gram(s) IV Push once  dextrose 50% Injectable 25 Gram(s) IV Push once  dextrose 50% Injectable 25 Gram(s) IV Push once  enoxaparin Injectable 40 milliGRAM(s) SubCutaneous daily  insulin glargine Injectable (LANTUS) 15 Unit(s) SubCutaneous at bedtime  insulin lispro (HumaLOG) corrective regimen sliding scale   SubCutaneous three times a day before meals  insulin lispro Injectable (HumaLOG) 5 Unit(s) SubCutaneous three times a day before meals  lactulose Syrup 15 Gram(s) Oral daily  levoFLOXacin IVPB      levoFLOXacin IVPB 750 milliGRAM(s) IV Intermittent every 24 hours  losartan 25 milliGRAM(s) Oral daily  montelukast 10 milliGRAM(s) Oral daily  PARoxetine 40 milliGRAM(s) Oral daily  simvastatin 20 milliGRAM(s) Oral at bedtime  triamterene 37.5 mG/hydrochlorothiazide 25 mG Tablet 1 Tablet(s) Oral daily    PRN MEDICATIONS  clonazePAM Tablet 0.5 milliGRAM(s) Oral two times a day PRN  dextrose 40% Gel 15 Gram(s) Oral once PRN  glucagon  Injectable 1 milliGRAM(s) IntraMuscular once PRN    VITALS:   T(F): 98.7  HR: 78  BP: 124/58  RR: 18  SpO2: --    LABS:                        8.9    7.97  )-----------( 350      ( 23 Feb 2019 07:40 )             27.9     02-23    143  |  101  |  9<L>  ----------------------------<  290<H>  4.0   |  26  |  0.5<L>    Ca    8.8      23 Feb 2019 07:40                Culture - Blood (collected 22 Feb 2019 09:17)  Source: .Blood None  Preliminary Report (23 Feb 2019 17:01):    No growth to date.    Culture - Blood (collected 22 Feb 2019 01:59)  Source: .Blood None  Preliminary Report (23 Feb 2019 06:01):    No growth to date.          RADIOLOGY:    PHYSICAL EXAM:  GEN: No acute distress  PULM: Clear to auscultation bilaterally   CVS: Regular, s1s2, no murmurs  ABD: Soft, non-tender, non-distended +BS  EXT: no edema  NEURO: AAOX3

## 2019-02-24 VITALS
RESPIRATION RATE: 18 BRPM | HEART RATE: 73 BPM | TEMPERATURE: 99 F | SYSTOLIC BLOOD PRESSURE: 123 MMHG | DIASTOLIC BLOOD PRESSURE: 64 MMHG

## 2019-02-24 LAB
ANION GAP SERPL CALC-SCNC: 11 MMOL/L — SIGNIFICANT CHANGE UP (ref 7–14)
BASOPHILS # BLD AUTO: 0.1 K/UL — SIGNIFICANT CHANGE UP (ref 0–0.2)
BASOPHILS NFR BLD AUTO: 1.4 % — HIGH (ref 0–1)
BUN SERPL-MCNC: 10 MG/DL — SIGNIFICANT CHANGE UP (ref 10–20)
CALCIUM SERPL-MCNC: 8.9 MG/DL — SIGNIFICANT CHANGE UP (ref 8.5–10.1)
CHLORIDE SERPL-SCNC: 104 MMOL/L — SIGNIFICANT CHANGE UP (ref 98–110)
CO2 SERPL-SCNC: 31 MMOL/L — SIGNIFICANT CHANGE UP (ref 17–32)
CREAT SERPL-MCNC: 0.6 MG/DL — LOW (ref 0.7–1.5)
EOSINOPHIL # BLD AUTO: 0.2 K/UL — SIGNIFICANT CHANGE UP (ref 0–0.7)
EOSINOPHIL NFR BLD AUTO: 2.9 % — SIGNIFICANT CHANGE UP (ref 0–8)
GLUCOSE BLDC GLUCOMTR-MCNC: 160 MG/DL — HIGH (ref 70–99)
GLUCOSE BLDC GLUCOMTR-MCNC: 74 MG/DL — SIGNIFICANT CHANGE UP (ref 70–99)
GLUCOSE SERPL-MCNC: 180 MG/DL — HIGH (ref 70–99)
HCT VFR BLD CALC: 29.1 % — LOW (ref 37–47)
HGB BLD-MCNC: 9.3 G/DL — LOW (ref 12–16)
IMM GRANULOCYTES NFR BLD AUTO: 3.3 % — HIGH (ref 0.1–0.3)
LYMPHOCYTES # BLD AUTO: 2.16 K/UL — SIGNIFICANT CHANGE UP (ref 1.2–3.4)
LYMPHOCYTES # BLD AUTO: 31.1 % — SIGNIFICANT CHANGE UP (ref 20.5–51.1)
MCHC RBC-ENTMCNC: 28.6 PG — SIGNIFICANT CHANGE UP (ref 27–31)
MCHC RBC-ENTMCNC: 32 G/DL — SIGNIFICANT CHANGE UP (ref 32–37)
MCV RBC AUTO: 89.5 FL — SIGNIFICANT CHANGE UP (ref 81–99)
MONOCYTES # BLD AUTO: 0.59 K/UL — SIGNIFICANT CHANGE UP (ref 0.1–0.6)
MONOCYTES NFR BLD AUTO: 8.5 % — SIGNIFICANT CHANGE UP (ref 1.7–9.3)
NEUTROPHILS # BLD AUTO: 3.67 K/UL — SIGNIFICANT CHANGE UP (ref 1.4–6.5)
NEUTROPHILS NFR BLD AUTO: 52.8 % — SIGNIFICANT CHANGE UP (ref 42.2–75.2)
NRBC # BLD: 0 /100 WBCS — SIGNIFICANT CHANGE UP (ref 0–0)
PLATELET # BLD AUTO: 428 K/UL — HIGH (ref 130–400)
POTASSIUM SERPL-MCNC: 4.1 MMOL/L — SIGNIFICANT CHANGE UP (ref 3.5–5)
POTASSIUM SERPL-SCNC: 4.1 MMOL/L — SIGNIFICANT CHANGE UP (ref 3.5–5)
RBC # BLD: 3.25 M/UL — LOW (ref 4.2–5.4)
RBC # FLD: 15.1 % — HIGH (ref 11.5–14.5)
S PNEUM AG UR QL: NEGATIVE — SIGNIFICANT CHANGE UP
SODIUM SERPL-SCNC: 146 MMOL/L — SIGNIFICANT CHANGE UP (ref 135–146)
WBC # BLD: 6.95 K/UL — SIGNIFICANT CHANGE UP (ref 4.8–10.8)
WBC # FLD AUTO: 6.95 K/UL — SIGNIFICANT CHANGE UP (ref 4.8–10.8)

## 2019-02-24 RX ORDER — CIPROFLOXACIN LACTATE 400MG/40ML
1 VIAL (ML) INTRAVENOUS
Qty: 2 | Refills: 0 | OUTPATIENT
Start: 2019-02-24 | End: 2019-02-25

## 2019-02-24 RX ADMIN — AMLODIPINE BESYLATE 5 MILLIGRAM(S): 2.5 TABLET ORAL at 05:55

## 2019-02-24 RX ADMIN — Medication 1 TABLET(S): at 05:31

## 2019-02-24 RX ADMIN — ENOXAPARIN SODIUM 40 MILLIGRAM(S): 100 INJECTION SUBCUTANEOUS at 11:31

## 2019-02-24 RX ADMIN — Medication 5 UNIT(S): at 08:16

## 2019-02-24 RX ADMIN — LOSARTAN POTASSIUM 25 MILLIGRAM(S): 100 TABLET, FILM COATED ORAL at 05:29

## 2019-02-24 RX ADMIN — CHLORHEXIDINE GLUCONATE 1 APPLICATION(S): 213 SOLUTION TOPICAL at 05:28

## 2019-02-24 RX ADMIN — BUDESONIDE AND FORMOTEROL FUMARATE DIHYDRATE 2 PUFF(S): 160; 4.5 AEROSOL RESPIRATORY (INHALATION) at 08:16

## 2019-02-24 RX ADMIN — MONTELUKAST 10 MILLIGRAM(S): 4 TABLET, CHEWABLE ORAL at 11:31

## 2019-02-24 RX ADMIN — LACTULOSE 15 GRAM(S): 10 SOLUTION ORAL at 11:31

## 2019-02-24 RX ADMIN — Medication 40 MILLIGRAM(S): at 11:31

## 2019-02-24 RX ADMIN — Medication 2: at 08:16

## 2019-02-26 LAB
CULTURE RESULTS: SIGNIFICANT CHANGE UP
M PNEUMO IGM SER-ACNC: <20 UNITS/ML — SIGNIFICANT CHANGE UP
MYCOPLASMA AG SPEC QL: NEGATIVE — SIGNIFICANT CHANGE UP
SPECIMEN SOURCE: SIGNIFICANT CHANGE UP

## 2019-02-27 LAB
CULTURE RESULTS: SIGNIFICANT CHANGE UP
CULTURE RESULTS: SIGNIFICANT CHANGE UP
SPECIMEN SOURCE: SIGNIFICANT CHANGE UP
SPECIMEN SOURCE: SIGNIFICANT CHANGE UP

## 2019-02-28 DIAGNOSIS — R50.9 FEVER, UNSPECIFIED: ICD-10-CM

## 2019-02-28 DIAGNOSIS — J45.909 UNSPECIFIED ASTHMA, UNCOMPLICATED: ICD-10-CM

## 2019-02-28 DIAGNOSIS — E87.6 HYPOKALEMIA: ICD-10-CM

## 2019-02-28 DIAGNOSIS — I10 ESSENTIAL (PRIMARY) HYPERTENSION: ICD-10-CM

## 2019-02-28 DIAGNOSIS — J15.4 PNEUMONIA DUE TO OTHER STREPTOCOCCI: ICD-10-CM

## 2019-02-28 DIAGNOSIS — A41.9 SEPSIS, UNSPECIFIED ORGANISM: ICD-10-CM

## 2019-02-28 DIAGNOSIS — Z79.84 LONG TERM (CURRENT) USE OF ORAL HYPOGLYCEMIC DRUGS: ICD-10-CM

## 2019-02-28 DIAGNOSIS — E83.42 HYPOMAGNESEMIA: ICD-10-CM

## 2019-02-28 DIAGNOSIS — E78.5 HYPERLIPIDEMIA, UNSPECIFIED: ICD-10-CM

## 2019-02-28 DIAGNOSIS — E43 UNSPECIFIED SEVERE PROTEIN-CALORIE MALNUTRITION: ICD-10-CM

## 2019-02-28 DIAGNOSIS — F41.9 ANXIETY DISORDER, UNSPECIFIED: ICD-10-CM

## 2019-02-28 DIAGNOSIS — E11.9 TYPE 2 DIABETES MELLITUS WITHOUT COMPLICATIONS: ICD-10-CM

## 2019-02-28 DIAGNOSIS — F32.9 MAJOR DEPRESSIVE DISORDER, SINGLE EPISODE, UNSPECIFIED: ICD-10-CM

## 2019-02-28 DIAGNOSIS — D64.9 ANEMIA, UNSPECIFIED: ICD-10-CM

## 2019-02-28 DIAGNOSIS — J44.0 CHRONIC OBSTRUCTIVE PULMONARY DISEASE WITH (ACUTE) LOWER RESPIRATORY INFECTION: ICD-10-CM

## 2019-04-03 ENCOUNTER — EMERGENCY (EMERGENCY)
Facility: HOSPITAL | Age: 69
LOS: 0 days | Discharge: HOME | End: 2019-04-03
Attending: EMERGENCY MEDICINE | Admitting: EMERGENCY MEDICINE
Payer: MEDICARE

## 2019-04-03 VITALS
OXYGEN SATURATION: 97 % | HEART RATE: 79 BPM | TEMPERATURE: 98 F | RESPIRATION RATE: 19 BRPM | DIASTOLIC BLOOD PRESSURE: 61 MMHG | SYSTOLIC BLOOD PRESSURE: 137 MMHG

## 2019-04-03 VITALS
HEART RATE: 72 BPM | TEMPERATURE: 98 F | SYSTOLIC BLOOD PRESSURE: 110 MMHG | OXYGEN SATURATION: 98 % | DIASTOLIC BLOOD PRESSURE: 58 MMHG

## 2019-04-03 DIAGNOSIS — Z79.4 LONG TERM (CURRENT) USE OF INSULIN: ICD-10-CM

## 2019-04-03 DIAGNOSIS — Z79.899 OTHER LONG TERM (CURRENT) DRUG THERAPY: ICD-10-CM

## 2019-04-03 DIAGNOSIS — E11.9 TYPE 2 DIABETES MELLITUS WITHOUT COMPLICATIONS: ICD-10-CM

## 2019-04-03 DIAGNOSIS — Z90.49 ACQUIRED ABSENCE OF OTHER SPECIFIED PARTS OF DIGESTIVE TRACT: ICD-10-CM

## 2019-04-03 DIAGNOSIS — Z90.710 ACQUIRED ABSENCE OF BOTH CERVIX AND UTERUS: ICD-10-CM

## 2019-04-03 DIAGNOSIS — Z79.1 LONG TERM (CURRENT) USE OF NON-STEROIDAL ANTI-INFLAMMATORIES (NSAID): ICD-10-CM

## 2019-04-03 DIAGNOSIS — Z90.49 ACQUIRED ABSENCE OF OTHER SPECIFIED PARTS OF DIGESTIVE TRACT: Chronic | ICD-10-CM

## 2019-04-03 DIAGNOSIS — Z79.2 LONG TERM (CURRENT) USE OF ANTIBIOTICS: ICD-10-CM

## 2019-04-03 DIAGNOSIS — J11.1 INFLUENZA DUE TO UNIDENTIFIED INFLUENZA VIRUS WITH OTHER RESPIRATORY MANIFESTATIONS: ICD-10-CM

## 2019-04-03 DIAGNOSIS — Z79.51 LONG TERM (CURRENT) USE OF INHALED STEROIDS: ICD-10-CM

## 2019-04-03 DIAGNOSIS — I10 ESSENTIAL (PRIMARY) HYPERTENSION: ICD-10-CM

## 2019-04-03 DIAGNOSIS — R50.9 FEVER, UNSPECIFIED: ICD-10-CM

## 2019-04-03 DIAGNOSIS — Z90.710 ACQUIRED ABSENCE OF BOTH CERVIX AND UTERUS: Chronic | ICD-10-CM

## 2019-04-03 DIAGNOSIS — Z79.84 LONG TERM (CURRENT) USE OF ORAL HYPOGLYCEMIC DRUGS: ICD-10-CM

## 2019-04-03 PROBLEM — F41.9 ANXIETY DISORDER, UNSPECIFIED: Chronic | Status: ACTIVE | Noted: 2019-02-20

## 2019-04-03 PROBLEM — E78.5 HYPERLIPIDEMIA, UNSPECIFIED: Chronic | Status: ACTIVE | Noted: 2019-02-20

## 2019-04-03 LAB
ANION GAP SERPL CALC-SCNC: 15 MMOL/L — HIGH (ref 7–14)
BASOPHILS # BLD AUTO: 0.07 K/UL — SIGNIFICANT CHANGE UP (ref 0–0.2)
BASOPHILS NFR BLD AUTO: 1.4 % — HIGH (ref 0–1)
BUN SERPL-MCNC: 14 MG/DL — SIGNIFICANT CHANGE UP (ref 10–20)
CALCIUM SERPL-MCNC: 9.7 MG/DL — SIGNIFICANT CHANGE UP (ref 8.5–10.1)
CHLORIDE SERPL-SCNC: 95 MMOL/L — LOW (ref 98–110)
CO2 SERPL-SCNC: 30 MMOL/L — SIGNIFICANT CHANGE UP (ref 17–32)
CREAT SERPL-MCNC: 0.7 MG/DL — SIGNIFICANT CHANGE UP (ref 0.7–1.5)
EOSINOPHIL # BLD AUTO: 0.03 K/UL — SIGNIFICANT CHANGE UP (ref 0–0.7)
EOSINOPHIL NFR BLD AUTO: 0.6 % — SIGNIFICANT CHANGE UP (ref 0–8)
FLU A RESULT: POSITIVE
FLU A RESULT: POSITIVE
FLUAV AG NPH QL: POSITIVE
FLUBV AG NPH QL: NEGATIVE — SIGNIFICANT CHANGE UP
GLUCOSE SERPL-MCNC: 213 MG/DL — HIGH (ref 70–99)
HCT VFR BLD CALC: 34.2 % — LOW (ref 37–47)
HGB BLD-MCNC: 10.9 G/DL — LOW (ref 12–16)
IMM GRANULOCYTES NFR BLD AUTO: 0.2 % — SIGNIFICANT CHANGE UP (ref 0.1–0.3)
LACTATE SERPL-SCNC: 2.1 MMOL/L — SIGNIFICANT CHANGE UP (ref 0.5–2.2)
LYMPHOCYTES # BLD AUTO: 0.78 K/UL — LOW (ref 1.2–3.4)
LYMPHOCYTES # BLD AUTO: 15.3 % — LOW (ref 20.5–51.1)
MCHC RBC-ENTMCNC: 28.8 PG — SIGNIFICANT CHANGE UP (ref 27–31)
MCHC RBC-ENTMCNC: 31.9 G/DL — LOW (ref 32–37)
MCV RBC AUTO: 90.5 FL — SIGNIFICANT CHANGE UP (ref 81–99)
MONOCYTES # BLD AUTO: 0.73 K/UL — HIGH (ref 0.1–0.6)
MONOCYTES NFR BLD AUTO: 14.3 % — HIGH (ref 1.7–9.3)
NEUTROPHILS # BLD AUTO: 3.47 K/UL — SIGNIFICANT CHANGE UP (ref 1.4–6.5)
NEUTROPHILS NFR BLD AUTO: 68.2 % — SIGNIFICANT CHANGE UP (ref 42.2–75.2)
NRBC # BLD: 0 /100 WBCS — SIGNIFICANT CHANGE UP (ref 0–0)
PLATELET # BLD AUTO: 302 K/UL — SIGNIFICANT CHANGE UP (ref 130–400)
POTASSIUM SERPL-MCNC: 3 MMOL/L — LOW (ref 3.5–5)
POTASSIUM SERPL-SCNC: 3 MMOL/L — LOW (ref 3.5–5)
RBC # BLD: 3.78 M/UL — LOW (ref 4.2–5.4)
RBC # FLD: 17.2 % — HIGH (ref 11.5–14.5)
RSV RESULT: NEGATIVE — SIGNIFICANT CHANGE UP
RSV RNA RESP QL NAA+PROBE: NEGATIVE — SIGNIFICANT CHANGE UP
SODIUM SERPL-SCNC: 140 MMOL/L — SIGNIFICANT CHANGE UP (ref 135–146)
WBC # BLD: 5.09 K/UL — SIGNIFICANT CHANGE UP (ref 4.8–10.8)
WBC # FLD AUTO: 5.09 K/UL — SIGNIFICANT CHANGE UP (ref 4.8–10.8)

## 2019-04-03 PROCEDURE — 93010 ELECTROCARDIOGRAM REPORT: CPT

## 2019-04-03 PROCEDURE — 99284 EMERGENCY DEPT VISIT MOD MDM: CPT

## 2019-04-03 PROCEDURE — 71046 X-RAY EXAM CHEST 2 VIEWS: CPT | Mod: 26

## 2019-04-03 RX ORDER — SODIUM CHLORIDE 9 MG/ML
3 INJECTION INTRAMUSCULAR; INTRAVENOUS; SUBCUTANEOUS EVERY 8 HOURS
Qty: 0 | Refills: 0 | Status: DISCONTINUED | OUTPATIENT
Start: 2019-04-03 | End: 2019-04-03

## 2019-04-03 RX ORDER — POTASSIUM CHLORIDE 20 MEQ
40 PACKET (EA) ORAL ONCE
Qty: 0 | Refills: 0 | Status: COMPLETED | OUTPATIENT
Start: 2019-04-03 | End: 2019-04-03

## 2019-04-03 RX ORDER — SODIUM CHLORIDE 9 MG/ML
1000 INJECTION INTRAMUSCULAR; INTRAVENOUS; SUBCUTANEOUS ONCE
Qty: 0 | Refills: 0 | Status: COMPLETED | OUTPATIENT
Start: 2019-04-03 | End: 2019-04-03

## 2019-04-03 RX ORDER — IPRATROPIUM/ALBUTEROL SULFATE 18-103MCG
3 AEROSOL WITH ADAPTER (GRAM) INHALATION ONCE
Qty: 0 | Refills: 0 | Status: COMPLETED | OUTPATIENT
Start: 2019-04-03 | End: 2019-04-03

## 2019-04-03 RX ADMIN — Medication 40 MILLIEQUIVALENT(S): at 11:35

## 2019-04-03 RX ADMIN — SODIUM CHLORIDE 1000 MILLILITER(S): 9 INJECTION INTRAMUSCULAR; INTRAVENOUS; SUBCUTANEOUS at 09:00

## 2019-04-03 RX ADMIN — Medication 3 MILLILITER(S): at 09:00

## 2019-04-03 RX ADMIN — SODIUM CHLORIDE 1000 MILLILITER(S): 9 INJECTION INTRAMUSCULAR; INTRAVENOUS; SUBCUTANEOUS at 11:40

## 2019-04-03 NOTE — ED PROVIDER NOTE - CLINICAL SUMMARY MEDICAL DECISION MAKING FREE TEXT BOX
67 yo female with influenza A.  Well-appearing, CXR negative, labs otherwise WNL, stable for d/c home.  Strict return precautions given.

## 2019-04-03 NOTE — ED PROVIDER NOTE - NSFOLLOWUPINSTRUCTIONS_ED_ALL_ED_FT
Influenza    WHAT YOU NEED TO KNOW:    Influenza (the flu) is an infection caused by the influenza virus. The flu is easily spread when an infected person coughs, sneezes, or has close contact with others. You may be able to spread the flu to others for 1 week or longer after signs or symptoms appear.     DISCHARGE INSTRUCTIONS:    Call your local emergency number (911 in the ) if:     You have trouble breathing, and your lips look purple or blue.      You have a seizure.    Call your doctor if:     You are dizzy, or you are urinating less or not at all.       You have a headache with a stiff neck, and you feel tired or confused.      You have new pain or pressure in your chest.      Your symptoms, such as shortness of breath, vomiting, or diarrhea, get worse.       Your symptoms, such as fever and coughing, seem to get better, but then get worse.       You have new muscle pain or weakness.      You have questions or concerns about your condition or care.    Medicines: You may need any of the following:     Acetaminophen decreases pain and fever. It is available without a doctor's order. Ask how much to take and how often to take it. Follow directions. Read the labels of all other medicines you are using to see if they also contain acetaminophen, or ask your doctor or pharmacist. Acetaminophen can cause liver damage if not taken correctly. Do not use more than 4 grams (4,000 milligrams) total of acetaminophen in one day.       NSAIDs, such as ibuprofen, help decrease swelling, pain, and fever. This medicine is available with or without a doctor's order. NSAIDs can cause stomach bleeding or kidney problems in certain people. If you take blood thinner medicine, always ask your healthcare provider if NSAIDs are safe for you. Always read the medicine label and follow directions.      Antivirals help fight a viral infection.      Take your medicine as directed. Contact your healthcare provider if you think your medicine is not helping or if you have side effects. Tell him or her if you are allergic to any medicine. Keep a list of the medicines, vitamins, and herbs you take. Include the amounts, and when and why you take them. Bring the list or the pill bottles to follow-up visits. Carry your medicine list with you in case of an emergency.    Rest as much as you can to help you recover.    Drink liquids as directed to help prevent dehydration. Ask how much liquid to drink each day and which liquids are best for you.    Prevent the spread of influenza:     Wash your hands often. Use soap and water. Wash your hands after you use the bathroom, change a child's diapers, or sneeze. Wash your hands before you prepare or eat food. Use gel hand cleanser that has 60% alcohol, when soap and water are not available. Do not touch your eyes, nose, or mouth unless you have washed your hands first.Handwashing           Cover your mouth when you sneeze or cough. Cough into a tissue or the bend of your arm. If you use a tissue, throw it away immediately and wash your hands.       Clean shared items with a germ-killing . Clean table surfaces, doorknobs, and light switches. Do not share towels, silverware, and dishes with people who are sick. Wash bed sheets, towels, silverware, and dishes with soap and water.       Wear a mask over your mouth and nose if you are sick. The face mask may help protect others from becoming infected with the flu. Wear the mask when in common areas of your home or if you seek care with a healthcare provider.       Stay away from others if you are sick. Stay at home until 24 hours after your fever and symptoms are gone.      Influenza vaccine helps prevent influenza (flu). Everyone older than 6 months should get a yearly influenza vaccine. Get the vaccine as soon as it is available, usually in September or October each year.    Follow up with your healthcare provider as directed: Write down your questions so you remember to ask them during your visits.        © Copyright [employer] 2019 All illustrations and images included in CareNotes are the copyrighted property of [employer] or [employer].

## 2019-04-03 NOTE — ED ADULT NURSE NOTE - OBJECTIVE STATEMENT
Pt presents with c/o fever, chills, cough, throat pain, SOB while walking x 3days. T max 101. Denies N/V/D, CP.

## 2019-04-03 NOTE — ED ADULT NURSE NOTE - PRIMARY CARE PROVIDER
Patient spoke with Dr Cherelle Russo on the phone
Pt has a personal medical question for a nurse and would like a call back 
PMD

## 2019-04-03 NOTE — ED PROVIDER NOTE - PROGRESS NOTE DETAILS
Patient appears well, improved with nebs, has nebs at home.  Flu A positive, wishes to go home.  Patient to be discharged from ED. Any available test results were discussed with patient and daughter. Verbal instructions given, including instructions to return to ED immediately for any new, worsening, or concerning symptoms. Patient endorsed understanding. Written discharge instructions additionally given, including follow-up plan.  Patient was given opportunity to ask questions.

## 2019-04-03 NOTE — ED PROVIDER NOTE - ATTENDING CONTRIBUTION TO CARE
67 yo female h/o asthma, anxiety, depression, HLD, DM, HTN c/o fever, chills, cough for 3 days.  Symptoms are associated with mild sore throat; patient reports that multiple family members have similar symptoms.  Denies headache, neck pain, SOB, hemoptysis, CP, abdominal pain, N/V/D, no urinary complaints, no change in appetite or PO intake. .  Patient was admitted to this hospital on 2/20/19 for RUL pneumonia.  Well-appearing, elderly female, resting on a stretcher, smiling, NAD, PERRL, pink conjunctivae, mmm, nml work of breathing, speaking full sentences, equal air entry  b/l, faint expiratory wheezing, RRR, capillary refill< 2 sec, distal pulses intact, abdomen soft, NT/ND, BS present in all quadrants, no midline spine or CVA ttp, no leg edema or calf ttp, A&Ox3, no gross neuro deficits.  Will get CXR, give nebs, check labs, reassess.

## 2019-04-03 NOTE — ED PROVIDER NOTE - OBJECTIVE STATEMENT
67 y/o female with hx DM, HTN, Asthma, PNA presents to the ED c/o "I have a cough for 3 days with yellow phlegm. At 4 am I woke up with a fever of 101. I took Tylenol." no SOB/ CP/ weakness +sick contact daughter

## 2019-09-20 NOTE — ED ADULT NURSE NOTE - DOES PATIENT HAVE ADVANCE DIRECTIVE
No Azithromycin Pregnancy And Lactation Text: This medication is considered safe during pregnancy and is also secreted in breast milk.

## 2020-03-23 ENCOUNTER — TRANSCRIPTION ENCOUNTER (OUTPATIENT)
Age: 70
End: 2020-03-23

## 2021-05-20 ENCOUNTER — TRANSCRIPTION ENCOUNTER (OUTPATIENT)
Age: 71
End: 2021-05-20

## 2022-02-28 NOTE — PATIENT PROFILE ADULT - FUNCTIONAL SCREEN CURRENT LEVEL: COMMUNICATION, MLM
Quality 110: Preventive Care And Screening: Influenza Immunization: Influenza Immunization Administered during Influenza season Quality 431: Preventive Care And Screening: Unhealthy Alcohol Use - Screening: Patient screened for unhealthy alcohol use using a single question and scores less than 2 times per year Detail Level: Detailed Quality 111:Pneumonia Vaccination Status For Older Adults: Pneumococcal Vaccination not Administered or Previously Received, Reason not Otherwise Specified Quality 154 Part B: Falls: Risk Screening (Should Be Reported With Measure 155.): Patient screened for future fall risk; documentation of no falls in the past year or only one fall without injury in the past year Quality 154 Part A: Falls: Risk Assessment (Should Be Reported With Measure 155.): Falls risk assessment completed and documented in the past 12 months. Quality 226: Preventive Care And Screening: Tobacco Use: Screening And Cessation Intervention: Patient screened for tobacco use and is an ex/non-smoker Quality 402: Tobacco Use And Help With Quitting Among Adolescents: Patient screened for tobacco and never smoked Quality 155 (Denominator): Falls Plan Of Care: Plan of Care not Documented, Reason not Otherwise Specified Quality 47: Advance Care Plan: Advance care planning not documented, reason not otherwise specified. Quality 431: Preventive Care And Screening: Unhealthy Alcohol Use - Screening: Patient not identified as an unhealthy alcohol user when screened for unhealthy alcohol use using a systematic screening method 0 = understands/communicates without difficulty

## 2024-06-01 NOTE — ED PROVIDER NOTE - NS ED ATTENDING STATEMENT MOD
I have personally performed a face to face diagnostic evaluation on this patient. I have reviewed the ACP note and agree with the history, exam and plan of care, except as noted. 2

## 2025-04-14 NOTE — ED ADULT NURSE NOTE - NSFALLRSKINDICATORS_ED_ALL_ED
Patient arrives to ed via pov with mother. Pt c/o  left leg/thigh pain since skating down stairs and jumping onto leg.    
no